# Patient Record
Sex: FEMALE | Race: WHITE | NOT HISPANIC OR LATINO | URBAN - METROPOLITAN AREA
[De-identification: names, ages, dates, MRNs, and addresses within clinical notes are randomized per-mention and may not be internally consistent; named-entity substitution may affect disease eponyms.]

---

## 2017-05-16 ENCOUNTER — OUTPATIENT (OUTPATIENT)
Dept: OUTPATIENT SERVICES | Facility: HOSPITAL | Age: 82
LOS: 1 days | End: 2017-05-16
Payer: COMMERCIAL

## 2017-05-16 ENCOUNTER — APPOINTMENT (OUTPATIENT)
Dept: CT IMAGING | Facility: CLINIC | Age: 82
End: 2017-05-16

## 2017-05-16 DIAGNOSIS — M51.36 OTHER INTERVERTEBRAL DISC DEGENERATION, LUMBAR REGION: ICD-10-CM

## 2017-05-16 PROCEDURE — 72131 CT LUMBAR SPINE W/O DYE: CPT

## 2017-09-08 ENCOUNTER — EMERGENCY (EMERGENCY)
Facility: HOSPITAL | Age: 82
LOS: 1 days | Discharge: HOME | End: 2017-09-08
Attending: EMERGENCY MEDICINE | Admitting: EMERGENCY MEDICINE
Payer: COMMERCIAL

## 2017-09-08 VITALS
OXYGEN SATURATION: 97 % | HEART RATE: 68 BPM | TEMPERATURE: 98 F | HEIGHT: 61 IN | DIASTOLIC BLOOD PRESSURE: 72 MMHG | SYSTOLIC BLOOD PRESSURE: 137 MMHG | RESPIRATION RATE: 12 BRPM | WEIGHT: 132.06 LBS

## 2017-09-08 VITALS
OXYGEN SATURATION: 100 % | RESPIRATION RATE: 15 BRPM | DIASTOLIC BLOOD PRESSURE: 76 MMHG | HEART RATE: 65 BPM | TEMPERATURE: 98 F | SYSTOLIC BLOOD PRESSURE: 132 MMHG

## 2017-09-08 DIAGNOSIS — I10 ESSENTIAL (PRIMARY) HYPERTENSION: ICD-10-CM

## 2017-09-08 DIAGNOSIS — R68.84 JAW PAIN: ICD-10-CM

## 2017-09-08 DIAGNOSIS — Z85.3 PERSONAL HISTORY OF MALIGNANT NEOPLASM OF BREAST: ICD-10-CM

## 2017-09-08 LAB
ALBUMIN SERPL ELPH-MCNC: 3.4 G/DL — SIGNIFICANT CHANGE UP (ref 3.3–5)
ALP SERPL-CCNC: 66 U/L — SIGNIFICANT CHANGE UP (ref 40–120)
ALT FLD-CCNC: 43 U/L — SIGNIFICANT CHANGE UP (ref 12–78)
ANION GAP SERPL CALC-SCNC: 10 MMOL/L — SIGNIFICANT CHANGE UP (ref 5–17)
APTT BLD: 27.2 SEC — LOW (ref 27.5–37.4)
AST SERPL-CCNC: 37 U/L — SIGNIFICANT CHANGE UP (ref 15–37)
BASOPHILS # BLD AUTO: 0.1 K/UL — SIGNIFICANT CHANGE UP (ref 0–0.2)
BASOPHILS NFR BLD AUTO: 0.8 % — SIGNIFICANT CHANGE UP (ref 0–2)
BILIRUB SERPL-MCNC: 0.6 MG/DL — SIGNIFICANT CHANGE UP (ref 0.2–1.2)
BUN SERPL-MCNC: 23 MG/DL — SIGNIFICANT CHANGE UP (ref 7–23)
CALCIUM SERPL-MCNC: 9.2 MG/DL — SIGNIFICANT CHANGE UP (ref 8.5–10.1)
CHLORIDE SERPL-SCNC: 108 MMOL/L — SIGNIFICANT CHANGE UP (ref 96–108)
CK MB BLD-MCNC: 2.6 % — SIGNIFICANT CHANGE UP (ref 0–3.5)
CK MB BLD-MCNC: 3.1 % — SIGNIFICANT CHANGE UP (ref 0–3.5)
CK MB CFR SERPL CALC: 1.8 NG/ML — SIGNIFICANT CHANGE UP (ref 0–3.6)
CK MB CFR SERPL CALC: 1.9 NG/ML — SIGNIFICANT CHANGE UP (ref 0–3.6)
CK SERPL-CCNC: 62 U/L — SIGNIFICANT CHANGE UP (ref 26–192)
CK SERPL-CCNC: 69 U/L — SIGNIFICANT CHANGE UP (ref 26–192)
CO2 SERPL-SCNC: 24 MMOL/L — SIGNIFICANT CHANGE UP (ref 22–31)
CREAT SERPL-MCNC: 1.2 MG/DL — SIGNIFICANT CHANGE UP (ref 0.5–1.3)
EOSINOPHIL # BLD AUTO: 0.2 K/UL — SIGNIFICANT CHANGE UP (ref 0–0.5)
EOSINOPHIL NFR BLD AUTO: 2 % — SIGNIFICANT CHANGE UP (ref 0–6)
GLUCOSE SERPL-MCNC: 157 MG/DL — HIGH (ref 70–99)
HCT VFR BLD CALC: 38.7 % — SIGNIFICANT CHANGE UP (ref 34.5–45)
HGB BLD-MCNC: 13.4 G/DL — SIGNIFICANT CHANGE UP (ref 11.5–15.5)
INR BLD: 1.11 RATIO — SIGNIFICANT CHANGE UP (ref 0.88–1.16)
LYMPHOCYTES # BLD AUTO: 1.7 K/UL — SIGNIFICANT CHANGE UP (ref 1–3.3)
LYMPHOCYTES # BLD AUTO: 21.7 % — SIGNIFICANT CHANGE UP (ref 13–44)
MCHC RBC-ENTMCNC: 34.7 GM/DL — SIGNIFICANT CHANGE UP (ref 32–36)
MCHC RBC-ENTMCNC: 35.1 PG — HIGH (ref 27–34)
MCV RBC AUTO: 101 FL — HIGH (ref 80–100)
MONOCYTES # BLD AUTO: 0.5 K/UL — SIGNIFICANT CHANGE UP (ref 0–0.9)
MONOCYTES NFR BLD AUTO: 6.5 % — SIGNIFICANT CHANGE UP (ref 1–9)
NEUTROPHILS # BLD AUTO: 5.5 K/UL — SIGNIFICANT CHANGE UP (ref 1.8–7.4)
NEUTROPHILS NFR BLD AUTO: 69 % — SIGNIFICANT CHANGE UP (ref 43–77)
NT-PROBNP SERPL-SCNC: 224 PG/ML — SIGNIFICANT CHANGE UP (ref 0–450)
PLATELET # BLD AUTO: 125 K/UL — LOW (ref 150–400)
POTASSIUM SERPL-MCNC: 3.7 MMOL/L — SIGNIFICANT CHANGE UP (ref 3.5–5.3)
POTASSIUM SERPL-SCNC: 3.7 MMOL/L — SIGNIFICANT CHANGE UP (ref 3.5–5.3)
PROT SERPL-MCNC: 6.8 G/DL — SIGNIFICANT CHANGE UP (ref 6–8.3)
PROTHROM AB SERPL-ACNC: 12.1 SEC — SIGNIFICANT CHANGE UP (ref 9.8–12.7)
RBC # BLD: 3.83 M/UL — SIGNIFICANT CHANGE UP (ref 3.8–5.2)
RBC # FLD: 11.4 % — SIGNIFICANT CHANGE UP (ref 10.3–14.5)
SODIUM SERPL-SCNC: 142 MMOL/L — SIGNIFICANT CHANGE UP (ref 135–145)
TROPONIN I SERPL-MCNC: 0.07 NG/ML — HIGH (ref 0.01–0.04)
TROPONIN I SERPL-MCNC: 0.07 NG/ML — HIGH (ref 0.01–0.04)
WBC # BLD: 8 K/UL — SIGNIFICANT CHANGE UP (ref 3.8–10.5)
WBC # FLD AUTO: 8 K/UL — SIGNIFICANT CHANGE UP (ref 3.8–10.5)

## 2017-09-08 PROCEDURE — 83880 ASSAY OF NATRIURETIC PEPTIDE: CPT

## 2017-09-08 PROCEDURE — 85730 THROMBOPLASTIN TIME PARTIAL: CPT

## 2017-09-08 PROCEDURE — 85610 PROTHROMBIN TIME: CPT

## 2017-09-08 PROCEDURE — 93005 ELECTROCARDIOGRAM TRACING: CPT

## 2017-09-08 PROCEDURE — 84484 ASSAY OF TROPONIN QUANT: CPT

## 2017-09-08 PROCEDURE — 99285 EMERGENCY DEPT VISIT HI MDM: CPT

## 2017-09-08 PROCEDURE — 82553 CREATINE MB FRACTION: CPT

## 2017-09-08 PROCEDURE — 71010: CPT | Mod: 26

## 2017-09-08 PROCEDURE — 80053 COMPREHEN METABOLIC PANEL: CPT

## 2017-09-08 PROCEDURE — 82550 ASSAY OF CK (CPK): CPT

## 2017-09-08 PROCEDURE — 71045 X-RAY EXAM CHEST 1 VIEW: CPT

## 2017-09-08 PROCEDURE — 85027 COMPLETE CBC AUTOMATED: CPT

## 2017-09-08 PROCEDURE — 99284 EMERGENCY DEPT VISIT MOD MDM: CPT | Mod: 25

## 2017-09-08 RX ORDER — SODIUM CHLORIDE 9 MG/ML
3 INJECTION INTRAMUSCULAR; INTRAVENOUS; SUBCUTANEOUS ONCE
Qty: 0 | Refills: 0 | Status: COMPLETED | OUTPATIENT
Start: 2017-09-08 | End: 2017-09-08

## 2017-09-08 RX ORDER — ASPIRIN/CALCIUM CARB/MAGNESIUM 324 MG
325 TABLET ORAL ONCE
Qty: 0 | Refills: 0 | Status: COMPLETED | OUTPATIENT
Start: 2017-09-08 | End: 2017-09-08

## 2017-09-08 RX ADMIN — Medication 325 MILLIGRAM(S): at 13:20

## 2017-09-08 RX ADMIN — SODIUM CHLORIDE 3 MILLILITER(S): 9 INJECTION INTRAMUSCULAR; INTRAVENOUS; SUBCUTANEOUS at 13:18

## 2017-09-08 NOTE — CONSULT NOTE ADULT - ASSESSMENT
Patient is a 89yo female with pmhx of CAD, HTN, s/p coronary stent x2, s/p pacemaker placement, breast ca s/p lumpectomy and mastectomy, gastric ulcer, UTI presented to ED with intermittent jaw pain.    - given patient denies chest pain, SOB, mildly elevated troponin and no ST changes noted on EKG, pain unlikely due to ischemia  - will need her most recent echo to fully evaluate heart function Patient is a 87yo female with pmhx of CAD, HTN, s/p coronary stent x2, s/p pacemaker placement, breast ca s/p lumpectomy and mastectomy, gastric ulcer, UTI presented to ED with intermittent jaw pain.    - given patient denies chest pain, SOB, mildly elevated troponin and no ST changes noted on EKG, pain unlikely due to ischemia  - will need her most recent echo to fully evaluate heart function  - trend cardiac enzyme   - monitor vitals Patient is a 87yo female with pmhx of CAD, HTN, s/p coronary stent x2, s/p pacemaker placement, breast ca s/p lumpectomy and mastectomy, gastric ulcer, UTI presented to ED with intermittent jaw pain.    - given patient denies chest pain, SOB, mildly elevated troponin and no ST changes noted on EKG, pain unlikely due to ischemia  - will need her most recent echo to fully evaluate heart function  - trend cardiac enzyme, repeat 1x cardiac enzyme at 6pm  - monitor vitals Patient is a 89yo female with pmhx of CAD, HTN, s/p coronary stent x2, s/p pacemaker placement, breast ca s/p lumpectomy and mastectomy, gastric ulcer, UTI presented to ED with intermittent jaw pain.    - given patient denies chest pain, SOB, mildly elevated troponin and no ST changes noted on EKG, need to r/o pain related to ischemia vs nonischemic pain  - will need her most recent echo to fully evaluate heart function  - trend cardiac enzyme, repeat cardiac enzyme x1 at 18:45  - monitor vitals  - patient will need follow up with her outpatient cardiologist when discharged Patient is a 87yo female with pmhx of CAD, HTN, s/p coronary stent x2, s/p pacemaker placement, breast ca s/p lumpectomy and mastectomy, gastric ulcer, UTI presented to ED with intermittent jaw pain.    - Pain similar to pain surrounding stents.  Troponin mildly positive with nl CK.  Need to repeat both at 645.  EKG unremarkable.    - If negative, d/c for outpatient echocardiogram and nuclear stress test.    - monitor vitals  - patient will need follow up with her outpatient cardiologist when discharged  - Continue aspirin

## 2017-09-08 NOTE — ED PROVIDER NOTE - PSH
Hernia  ventral hernia repaired x2 2005 and 2006  History of Coronary Angioplasty  1 stent placed 12/07  left  2005 with reconstruction, first implant, got infected then removed on left, tram flap done on left  axillary node dissection  had lymphedema  right lumpectomy  2001 with sentinal node biopsy  Stress Incontinence  had bladder lift x2 30 years ago and 28 years ago

## 2017-09-08 NOTE — CONSULT NOTE ADULT - SUBJECTIVE AND OBJECTIVE BOX
[facility]         Raj De La Cruz, Bessy, Kylah, Rose, Heather Borges        [phone] (office)    CHIEF COMPLAINT: Patient is a 88y old  Female who presents with a chief complaint of     HPI: Patient is a 89yo female with pmhx of CAD, HTN, s/p coronary stent x2, s/p pacemaker placement, breast ca s/p lumpectomy and mastectomy, gastric ulcer, UTI presented to ED with intermittent jaw pain since 8:30am today morning. Patient stated that the pain radiates to different side of her jaw and it comes and goes. Patient denies any chest pain, palpitations, SOB, or shoulder pain. Patient stated that she had some nausea associated with the jaw pain but has since then resolved. Patient stated that she was worried about her symptoms because she had similar symptoms prior to getting her stent. Patient follows with Dr. Ivey from [place] for cardiology, stated that she was told her ECHO was normal when she had the test done 6mo ago. Patient stated that she is able to walk around her house and up the stairs without any SOB. Currently, she is not complaining of any discomfort or pain. Stated that she is feeling well. Denies any headache, dizziness, chest pain, SOB, NIEVES, abdominal pain, weakness, or vomiting.       PAST MEDICAL & SURGICAL HISTORY:  Stented coronary artery: x2 2011 and 2013  UTI (Lower Urinary Tract Infection): last one 6 months ago  Breast Cancer: right side 2001  lumpectomy and s/p RT  left side 2005 s/p mastectomy  Gastric Ulcer: 2009  CAD (Coronary Artery Disease): 12/07  Hypertension  Insomnia  History of Coronary Angioplasty: 1 stent placed 12/07  Hernia: ventral hernia repaired x2 2005 and 2006  left: 2005 with reconstruction, first implant, got infected then removed on left, tram flap done on left  axillary node dissection  had lymphedema  right lumpectomy: 2001 with sentinal node biopsy  Stress Incontinence: had bladder lift x2 30 years ago and 28 years ago      SOCIAL HISTORY: No active tobacco, alcohol or illicit drug use    FAMILY HISTORY:      Outpatient medications:  - ramipril 5mg  - HCTZ 12.5mg  - metoprolol 100mg  - simvastatin 20mg  - aspirin 81mg  - ranitidine 300mg      Allergies    No Known Allergies    Intolerances        REVIEW OF SYSTEMS:   General: denies fever, chills, fatigue  HEENT: +jaw pain, denies headache, dizziness, visual changes, throat pain  Heart: denies chest pain, palpitations, edema  Lungs; denies SOB, NIEVES, or cough  Abdomen; +nausea, denies abdominal pain, vomiting  MSK: denies muscle pain      VITAL SIGNS:   Vital Signs Last 24 Hrs  T(C): 36.4 (08 Sep 2017 11:56), Max: 36.4 (08 Sep 2017 11:56)  T(F): 97.5 (08 Sep 2017 11:56), Max: 97.5 (08 Sep 2017 11:56)  HR: 68 (08 Sep 2017 11:56) (68 - 68)  BP: 137/72 (08 Sep 2017 11:56) (137/72 - 137/72)  BP(mean): --  RR: 12 (08 Sep 2017 11:56) (12 - 12)  SpO2: 97% (08 Sep 2017 11:56) (97% - 97%)    I&O's Summary      PHYSICAL EXAM:    Constitutional: NAD, awake and alert, well-developed  Eyes:  EOMI, no oral cyanosis, conjunctivae clear, anicteric.  Pulmonary: Non-labored, breath sounds are clear bilaterally, no wheezing, rales or rhonchi  Cardiovascular:  +distant heart sounds, regular S1 and S2. No murmur. No rubs, gallops or clicks  Gastrointestinal: Bowel Sounds present, soft, nontender.   Lymph: No peripheral edema.   Neurological: Alert, strength and sensitivity are grossly intact  Skin: No obvious lesions/rashes.   Psych:  Mood & affect appropriate .    LABS: All Labs Reviewed:                        13.4   8.0   )-----------( 125      ( 08 Sep 2017 12:42 )             38.7     08 Sep 2017 12:42    142    |  108    |  23     ----------------------------<  157    3.7     |  24     |  1.20     Ca    9.2        08 Sep 2017 12:42    TPro  6.8    /  Alb  3.4    /  TBili  0.6    /  DBili  x      /  AST  37     /  ALT  43     /  AlkPhos  66     08 Sep 2017 12:42    PT/INR - ( 08 Sep 2017 12:42 )   PT: 12.1 sec;   INR: 1.11 ratio         PTT - ( 08 Sep 2017 12:42 )  PTT:27.2 sec  CARDIAC MARKERS ( 08 Sep 2017 12:42 )  .066 ng/mL / x     / 69 U/L / x     / 1.8 ng/mL      Blood Culture:   09-08 @ 12:42  Pro Bnp 224      EKG: atrial paced rhythm, left axis deviation

## 2017-09-08 NOTE — ED PROVIDER NOTE - CHPI ED SYMPTOMS NEG
no vomiting/no cough/no chest pain/no diaphoresis/no dizziness/no fever/no back pain/no chills/no syncope

## 2017-09-08 NOTE — ED PROVIDER NOTE - PROGRESS NOTE DETAILS
Pt seen by Dr Rose, check CE 1900, if not increasing, ok to dc home for outpt fu. Pt doing well, no acute co or changes. results davie/w chica (cards) he cleared for d/c to f.u with her cardiologist at [facility] as outpt. Reevaluated patient at bedside.  Patient feeling well.  Discussed the results of all diagnostic testing in ED and copies of all reports given.   An opportunity to ask questions was given.  Discussed the importance of prompt, close medical follow-up.  Patient will return with any changes, concerns or persistent / worsening symptoms.  Understanding of all instructions verbalized.

## 2017-09-08 NOTE — ED PROVIDER NOTE - OBJECTIVE STATEMENT
87 yo F p/w mid jaw pain on / off since ~ 9am today. No assoc CP. No sob. no palp/dizzy. Some nausea assoc with sx. No numb/ting/focal weak. no abd pain. No neck / back pain. No recent NIEVES / easy fatigue. No agg/allev factors. No other inj or co. no recent trauma. NO other co.

## 2017-09-08 NOTE — ED ADULT NURSE REASSESSMENT NOTE - NS ED NURSE REASSESS COMMENT FT1
pt updated on POC, awaiting CE . pt still on cardiac, sat, bp monitor. pt denies jaw pain and nausea since arrival. will continue to monitor

## 2017-09-08 NOTE — ED ADULT NURSE NOTE - PMH
Breast Cancer  right side 2001  lumpectomy and s/p RT  left side 2005 s/p mastectomy  CAD (Coronary Artery Disease)  12/07  Gastric Ulcer  2009  Hypertension    Insomnia    Stented coronary artery  x2 2011 and 2013  UTI (Lower Urinary Tract Infection)  last one 6 months ago

## 2017-09-08 NOTE — ED ADULT NURSE NOTE - DISCHARGE TEACHING
patient discharged to home as per MD in stable condition. no apparent signs of acute cardiac or respiratory distress currently.  patient states she will follow up with cardiologist

## 2017-09-08 NOTE — ED ADULT NURSE NOTE - OBJECTIVE STATEMENT
pt reporting jaw pain and nausea since this am at 0900. pt stating that these are similar symptoms from when she had stent x2 placed in the past. pt reporting jaw pain and nausea since this am at 0900. pt stating that these are similar symptoms from when she had stent x2 placed in the past. denies CP, SOB, palpitations, lightheadedness and dizziness. pt placed on cardiac, sat, bp monitor. ekg and iv completed

## 2018-05-02 ENCOUNTER — OTHER (OUTPATIENT)
Age: 83
End: 2018-05-02

## 2018-05-10 ENCOUNTER — APPOINTMENT (OUTPATIENT)
Dept: ORTHOPEDIC SURGERY | Facility: CLINIC | Age: 83
End: 2018-05-10
Payer: MEDICARE

## 2018-05-10 VITALS
BODY MASS INDEX: 27.88 KG/M2 | SYSTOLIC BLOOD PRESSURE: 140 MMHG | DIASTOLIC BLOOD PRESSURE: 86 MMHG | HEART RATE: 87 BPM | WEIGHT: 142 LBS | HEIGHT: 60 IN

## 2018-05-10 DIAGNOSIS — Z78.9 OTHER SPECIFIED HEALTH STATUS: ICD-10-CM

## 2018-05-10 DIAGNOSIS — Z86.39 PERSONAL HISTORY OF OTHER ENDOCRINE, NUTRITIONAL AND METABOLIC DISEASE: ICD-10-CM

## 2018-05-10 DIAGNOSIS — Z86.79 PERSONAL HISTORY OF OTHER DISEASES OF THE CIRCULATORY SYSTEM: ICD-10-CM

## 2018-05-10 DIAGNOSIS — Z85.9 PERSONAL HISTORY OF MALIGNANT NEOPLASM, UNSPECIFIED: ICD-10-CM

## 2018-05-10 DIAGNOSIS — Z87.39 PERSONAL HISTORY OF OTHER DISEASES OF THE MUSCULOSKELETAL SYSTEM AND CONNECTIVE TISSUE: ICD-10-CM

## 2018-05-10 PROCEDURE — 99204 OFFICE O/P NEW MOD 45 MIN: CPT

## 2018-05-10 PROCEDURE — 73564 X-RAY EXAM KNEE 4 OR MORE: CPT | Mod: LT

## 2018-05-10 RX ORDER — FLUOROURACIL 50 MG/G
5 CREAM TOPICAL
Qty: 40 | Refills: 0 | Status: ACTIVE | COMMUNITY
Start: 2018-04-04

## 2018-05-10 RX ORDER — HYDROCHLOROTHIAZIDE 12.5 MG/1
12.5 CAPSULE ORAL
Qty: 90 | Refills: 0 | Status: ACTIVE | COMMUNITY
Start: 2018-02-28

## 2018-05-10 RX ORDER — RAMIPRIL 5 MG/1
5 CAPSULE ORAL
Qty: 180 | Refills: 0 | Status: ACTIVE | COMMUNITY
Start: 2018-02-28

## 2018-05-10 RX ORDER — TRAMADOL HYDROCHLORIDE AND ACETAMINOPHEN 37.5; 325 MG/1; MG/1
37.5-325 TABLET, FILM COATED ORAL
Qty: 30 | Refills: 0 | Status: ACTIVE | COMMUNITY
Start: 2018-03-14

## 2018-05-10 RX ORDER — SIMVASTATIN 20 MG/1
20 TABLET, FILM COATED ORAL
Qty: 90 | Refills: 0 | Status: ACTIVE | COMMUNITY
Start: 2018-01-31

## 2018-05-10 RX ORDER — HYALURONATE SODIUM 20 MG/2 ML
20 SYRINGE (ML) INTRAARTICULAR
Qty: 6 | Refills: 0 | Status: ACTIVE | OUTPATIENT
Start: 2018-05-10

## 2018-05-10 RX ORDER — RANITIDINE 300 MG/1
300 TABLET ORAL
Qty: 90 | Refills: 0 | Status: ACTIVE | COMMUNITY
Start: 2018-02-28

## 2018-05-10 RX ORDER — METOPROLOL SUCCINATE 100 MG/1
100 TABLET, EXTENDED RELEASE ORAL
Qty: 90 | Refills: 0 | Status: ACTIVE | COMMUNITY
Start: 2018-02-28

## 2018-05-10 RX ORDER — ONDANSETRON 4 MG/1
4 TABLET, ORALLY DISINTEGRATING ORAL
Qty: 15 | Refills: 0 | Status: ACTIVE | COMMUNITY
Start: 2018-01-24

## 2018-09-06 PROBLEM — Z95.5 PRESENCE OF CORONARY ANGIOPLASTY IMPLANT AND GRAFT: Chronic | Status: ACTIVE | Noted: 2017-09-08

## 2018-09-17 ENCOUNTER — OTHER (OUTPATIENT)
Age: 83
End: 2018-09-17

## 2018-09-24 ENCOUNTER — APPOINTMENT (OUTPATIENT)
Dept: ORTHOPEDIC SURGERY | Facility: CLINIC | Age: 83
End: 2018-09-24
Payer: MEDICARE

## 2018-09-24 VITALS
WEIGHT: 142 LBS | BODY MASS INDEX: 27.88 KG/M2 | HEIGHT: 60 IN | HEART RATE: 80 BPM | DIASTOLIC BLOOD PRESSURE: 78 MMHG | SYSTOLIC BLOOD PRESSURE: 148 MMHG

## 2018-09-24 DIAGNOSIS — M17.12 UNILATERAL PRIMARY OSTEOARTHRITIS, LEFT KNEE: ICD-10-CM

## 2018-09-24 PROCEDURE — 73564 X-RAY EXAM KNEE 4 OR MORE: CPT | Mod: LT

## 2018-09-24 PROCEDURE — 99214 OFFICE O/P EST MOD 30 MIN: CPT

## 2018-09-24 RX ORDER — OSELTAMIVIR PHOSPHATE 75 MG/1
75 CAPSULE ORAL
Qty: 10 | Refills: 0 | Status: DISCONTINUED | COMMUNITY
Start: 2018-01-24 | End: 2018-09-24

## 2018-09-24 RX ORDER — RANITIDINE HYDROCHLORIDE 300 MG/1
CAPSULE ORAL
Refills: 0 | Status: DISCONTINUED | COMMUNITY
End: 2018-09-24

## 2018-09-24 RX ORDER — CIPROFLOXACIN HYDROCHLORIDE 500 MG/1
500 TABLET, FILM COATED ORAL
Qty: 14 | Refills: 0 | Status: DISCONTINUED | COMMUNITY
Start: 2018-04-16 | End: 2018-09-24

## 2018-09-24 RX ORDER — RAMIPRIL 5 MG/1
CAPSULE ORAL
Refills: 0 | Status: DISCONTINUED | COMMUNITY
End: 2018-09-24

## 2018-09-24 RX ORDER — AZITHROMYCIN 250 MG/1
250 TABLET, FILM COATED ORAL
Qty: 6 | Refills: 0 | Status: DISCONTINUED | COMMUNITY
Start: 2018-04-23 | End: 2018-09-24

## 2019-11-06 NOTE — ED PROVIDER NOTE - NEUROLOGICAL, MLM
Medical record reviewed.    Attempted to reach patient at preferred listed phone number. Unable to reach patient at this time or leave [facility].     Writer reached out to Goldie with [employer] who has been UNABLE to connect with patient or her spouse. She has left messages but has not received a call back.     Next HRTIC outreach scheduled.    HRTIC Outreach Encounter scheduled to close: Will monitor until 11/11/19 to see if writer can connect with patient one more time before signing off on case, close date was 11/7/19.     Leanne Arthur  Transitional Care RN  [phone]    
Alert and oriented, no focal deficits, no motor or sensory deficits.

## 2021-02-24 ENCOUNTER — INPATIENT (INPATIENT)
Facility: HOSPITAL | Age: 86
LOS: 2 days | Discharge: HOME | DRG: 433 | End: 2021-02-27
Attending: HOSPITALIST | Admitting: HOSPITALIST
Payer: COMMERCIAL

## 2021-02-24 VITALS
TEMPERATURE: 98 F | HEIGHT: 61 IN | SYSTOLIC BLOOD PRESSURE: 113 MMHG | RESPIRATION RATE: 18 BRPM | WEIGHT: 130.07 LBS | HEART RATE: 69 BPM | DIASTOLIC BLOOD PRESSURE: 60 MMHG | OXYGEN SATURATION: 94 %

## 2021-02-24 DIAGNOSIS — R18.8 OTHER ASCITES: ICD-10-CM

## 2021-02-24 LAB
ALBUMIN SERPL ELPH-MCNC: 3 G/DL — LOW (ref 3.3–5.2)
ALP SERPL-CCNC: 136 U/L — HIGH (ref 40–120)
ALT FLD-CCNC: 30 U/L — SIGNIFICANT CHANGE UP
ANION GAP SERPL CALC-SCNC: 8 MMOL/L — SIGNIFICANT CHANGE UP (ref 5–17)
ANISOCYTOSIS BLD QL: SLIGHT — SIGNIFICANT CHANGE UP
APPEARANCE UR: CLEAR — SIGNIFICANT CHANGE UP
APTT BLD: 31.8 SEC — SIGNIFICANT CHANGE UP (ref 27.5–35.5)
AST SERPL-CCNC: 41 U/L — HIGH
B PERT IGG+IGM PNL SER: ABNORMAL
BACTERIA # UR AUTO: ABNORMAL
BASOPHILS # BLD AUTO: 0.1 K/UL — SIGNIFICANT CHANGE UP (ref 0–0.2)
BASOPHILS NFR BLD AUTO: 0.9 % — SIGNIFICANT CHANGE UP (ref 0–2)
BILIRUB SERPL-MCNC: 1 MG/DL — SIGNIFICANT CHANGE UP (ref 0.4–2)
BILIRUB UR-MCNC: NEGATIVE — SIGNIFICANT CHANGE UP
BLD GP AB SCN SERPL QL: SIGNIFICANT CHANGE UP
BUN SERPL-MCNC: 25 MG/DL — HIGH (ref 8–20)
CALCIUM SERPL-MCNC: 9.6 MG/DL — SIGNIFICANT CHANGE UP (ref 8.6–10.2)
CHLORIDE SERPL-SCNC: 107 MMOL/L — SIGNIFICANT CHANGE UP (ref 98–107)
CO2 SERPL-SCNC: 22 MMOL/L — SIGNIFICANT CHANGE UP (ref 22–29)
COLOR FLD: YELLOW
COLOR SPEC: YELLOW — SIGNIFICANT CHANGE UP
CREAT SERPL-MCNC: 1.33 MG/DL — HIGH (ref 0.5–1.3)
DACRYOCYTES BLD QL SMEAR: SLIGHT — SIGNIFICANT CHANGE UP
DIFF PNL FLD: NEGATIVE — SIGNIFICANT CHANGE UP
EOSINOPHIL # BLD AUTO: 0 K/UL — SIGNIFICANT CHANGE UP (ref 0–0.5)
EOSINOPHIL NFR BLD AUTO: 0 % — SIGNIFICANT CHANGE UP (ref 0–6)
EPI CELLS # UR: SIGNIFICANT CHANGE UP
FLUID INTAKE SUBSTANCE CLASS: SIGNIFICANT CHANGE UP
FLUID SEGMENTED GRANULOCYTES: 9 % — SIGNIFICANT CHANGE UP
GLUCOSE SERPL-MCNC: 120 MG/DL — HIGH (ref 70–99)
GLUCOSE UR QL: NEGATIVE — SIGNIFICANT CHANGE UP
GRAM STN FLD: SIGNIFICANT CHANGE UP
HCT VFR BLD CALC: 38.3 % — SIGNIFICANT CHANGE UP (ref 34.5–45)
HGB BLD-MCNC: 12.7 G/DL — SIGNIFICANT CHANGE UP (ref 11.5–15.5)
INR BLD: 1.39 RATIO — HIGH (ref 0.88–1.16)
KETONES UR-MCNC: NEGATIVE — SIGNIFICANT CHANGE UP
LEUKOCYTE ESTERASE UR-ACNC: ABNORMAL
LIDOCAIN IGE QN: 30 U/L — SIGNIFICANT CHANGE UP (ref 22–51)
LYMPHOCYTES # BLD AUTO: 0.82 K/UL — LOW (ref 1–3.3)
LYMPHOCYTES # BLD AUTO: 7 % — LOW (ref 13–44)
LYMPHOCYTES # FLD: 35 % — SIGNIFICANT CHANGE UP
MACROCYTES BLD QL: SLIGHT — SIGNIFICANT CHANGE UP
MANUAL SMEAR VERIFICATION: SIGNIFICANT CHANGE UP
MCHC RBC-ENTMCNC: 33.2 GM/DL — SIGNIFICANT CHANGE UP (ref 32–36)
MCHC RBC-ENTMCNC: 34.4 PG — HIGH (ref 27–34)
MCV RBC AUTO: 103.8 FL — HIGH (ref 80–100)
MESOTHL CELL # FLD: 2 % — SIGNIFICANT CHANGE UP
MONOCYTES # BLD AUTO: 0.71 K/UL — SIGNIFICANT CHANGE UP (ref 0–0.9)
MONOCYTES NFR BLD AUTO: 6.1 % — SIGNIFICANT CHANGE UP (ref 2–14)
MONOS+MACROS # FLD: 54 % — SIGNIFICANT CHANGE UP
NEUTROPHILS # BLD AUTO: 9.61 K/UL — HIGH (ref 1.8–7.4)
NEUTROPHILS NFR BLD AUTO: 82.5 % — HIGH (ref 43–77)
NITRITE UR-MCNC: POSITIVE
OVALOCYTES BLD QL SMEAR: SLIGHT — SIGNIFICANT CHANGE UP
PH UR: 6 — SIGNIFICANT CHANGE UP (ref 5–8)
PLAT MORPH BLD: NORMAL — SIGNIFICANT CHANGE UP
PLATELET # BLD AUTO: 85 K/UL — LOW (ref 150–400)
POIKILOCYTOSIS BLD QL AUTO: SLIGHT — SIGNIFICANT CHANGE UP
POTASSIUM SERPL-MCNC: 4.2 MMOL/L — SIGNIFICANT CHANGE UP (ref 3.5–5.3)
POTASSIUM SERPL-SCNC: 4.2 MMOL/L — SIGNIFICANT CHANGE UP (ref 3.5–5.3)
PROT SERPL-MCNC: 5.8 G/DL — LOW (ref 6.6–8.7)
PROT UR-MCNC: NEGATIVE — SIGNIFICANT CHANGE UP
PROTHROM AB SERPL-ACNC: 15.9 SEC — HIGH (ref 10.6–13.6)
RBC # BLD: 3.69 M/UL — LOW (ref 3.8–5.2)
RBC # FLD: 14.1 % — SIGNIFICANT CHANGE UP (ref 10.3–14.5)
RBC BLD AUTO: ABNORMAL
RBC CASTS # UR COMP ASSIST: SIGNIFICANT CHANGE UP /HPF (ref 0–4)
RCV VOL RI: 800 /UL — HIGH (ref 0–0)
SARS-COV-2 RNA SPEC QL NAA+PROBE: SIGNIFICANT CHANGE UP
SODIUM SERPL-SCNC: 137 MMOL/L — SIGNIFICANT CHANGE UP (ref 135–145)
SP GR SPEC: 1.01 — SIGNIFICANT CHANGE UP (ref 1.01–1.02)
SPECIMEN SOURCE: SIGNIFICANT CHANGE UP
TOTAL NUCLEATED CELL COUNT, BODY FLUID: 292 /UL — SIGNIFICANT CHANGE UP
TUBE TYPE: SIGNIFICANT CHANGE UP
UROBILINOGEN FLD QL: NEGATIVE — SIGNIFICANT CHANGE UP
VARIANT LYMPHS # BLD: 3.5 % — SIGNIFICANT CHANGE UP (ref 0–6)
WBC # BLD: 11.65 K/UL — HIGH (ref 3.8–10.5)
WBC # FLD AUTO: 11.65 K/UL — HIGH (ref 3.8–10.5)
WBC UR QL: SIGNIFICANT CHANGE UP

## 2021-02-24 PROCEDURE — 93010 ELECTROCARDIOGRAM REPORT: CPT

## 2021-02-24 PROCEDURE — 99223 1ST HOSP IP/OBS HIGH 75: CPT

## 2021-02-24 PROCEDURE — 99497 ADVNCD CARE PLAN 30 MIN: CPT | Mod: 25

## 2021-02-24 PROCEDURE — 74176 CT ABD & PELVIS W/O CONTRAST: CPT | Mod: 26,MA

## 2021-02-24 PROCEDURE — 49083 ABD PARACENTESIS W/IMAGING: CPT

## 2021-02-24 PROCEDURE — 99285 EMERGENCY DEPT VISIT HI MDM: CPT

## 2021-02-24 PROCEDURE — 71045 X-RAY EXAM CHEST 1 VIEW: CPT | Mod: 26

## 2021-02-24 RX ORDER — ASPIRIN/CALCIUM CARB/MAGNESIUM 324 MG
81 TABLET ORAL DAILY
Refills: 0 | Status: DISCONTINUED | OUTPATIENT
Start: 2021-02-24 | End: 2021-02-27

## 2021-02-24 RX ORDER — ALLOPURINOL 300 MG
200 TABLET ORAL DAILY
Refills: 0 | Status: DISCONTINUED | OUTPATIENT
Start: 2021-02-24 | End: 2021-02-27

## 2021-02-24 RX ORDER — SODIUM CHLORIDE 9 MG/ML
500 INJECTION, SOLUTION INTRAVENOUS
Refills: 0 | Status: COMPLETED | OUTPATIENT
Start: 2021-02-24 | End: 2021-02-24

## 2021-02-24 RX ORDER — METOPROLOL TARTRATE 50 MG
1 TABLET ORAL
Qty: 0 | Refills: 0 | DISCHARGE

## 2021-02-24 RX ORDER — LACTOBACILLUS ACIDOPHILUS 100MM CELL
1 CAPSULE ORAL
Refills: 0 | Status: DISCONTINUED | OUTPATIENT
Start: 2021-02-24 | End: 2021-02-27

## 2021-02-24 RX ORDER — HEPARIN SODIUM 5000 [USP'U]/ML
5000 INJECTION INTRAVENOUS; SUBCUTANEOUS EVERY 12 HOURS
Refills: 0 | Status: DISCONTINUED | OUTPATIENT
Start: 2021-02-25 | End: 2021-02-27

## 2021-02-24 RX ORDER — PANTOPRAZOLE SODIUM 20 MG/1
40 TABLET, DELAYED RELEASE ORAL DAILY
Refills: 0 | Status: DISCONTINUED | OUTPATIENT
Start: 2021-02-24 | End: 2021-02-25

## 2021-02-24 RX ORDER — METOPROLOL TARTRATE 50 MG
50 TABLET ORAL DAILY
Refills: 0 | Status: DISCONTINUED | OUTPATIENT
Start: 2021-02-24 | End: 2021-02-24

## 2021-02-24 RX ORDER — ONDANSETRON 8 MG/1
4 TABLET, FILM COATED ORAL EVERY 6 HOURS
Refills: 0 | Status: DISCONTINUED | OUTPATIENT
Start: 2021-02-24 | End: 2021-02-27

## 2021-02-24 RX ORDER — RANITIDINE HYDROCHLORIDE 150 MG/1
1 TABLET, FILM COATED ORAL
Qty: 0 | Refills: 0 | DISCHARGE

## 2021-02-24 RX ORDER — CEFTRIAXONE 500 MG/1
1000 INJECTION, POWDER, FOR SOLUTION INTRAMUSCULAR; INTRAVENOUS ONCE
Refills: 0 | Status: COMPLETED | OUTPATIENT
Start: 2021-02-24 | End: 2021-02-24

## 2021-02-24 RX ORDER — ACETAMINOPHEN 500 MG
885 TABLET ORAL EVERY 6 HOURS
Refills: 0 | Status: DISCONTINUED | OUTPATIENT
Start: 2021-02-24 | End: 2021-02-24

## 2021-02-24 RX ORDER — CEFTRIAXONE 500 MG/1
1000 INJECTION, POWDER, FOR SOLUTION INTRAMUSCULAR; INTRAVENOUS EVERY 24 HOURS
Refills: 0 | Status: COMPLETED | OUTPATIENT
Start: 2021-02-25 | End: 2021-02-27

## 2021-02-24 RX ORDER — ACETAMINOPHEN 500 MG
650 TABLET ORAL EVERY 6 HOURS
Refills: 0 | Status: DISCONTINUED | OUTPATIENT
Start: 2021-02-24 | End: 2021-02-27

## 2021-02-24 RX ORDER — CEFTRIAXONE 500 MG/1
INJECTION, POWDER, FOR SOLUTION INTRAMUSCULAR; INTRAVENOUS
Refills: 0 | Status: COMPLETED | OUTPATIENT
Start: 2021-02-24 | End: 2021-02-28

## 2021-02-24 RX ORDER — METOPROLOL TARTRATE 50 MG
50 TABLET ORAL DAILY
Refills: 0 | Status: DISCONTINUED | OUTPATIENT
Start: 2021-02-24 | End: 2021-02-27

## 2021-02-24 RX ADMIN — Medication 1 TABLET(S): at 18:59

## 2021-02-24 RX ADMIN — SODIUM CHLORIDE 75 MILLILITER(S): 9 INJECTION, SOLUTION INTRAVENOUS at 18:42

## 2021-02-24 RX ADMIN — PANTOPRAZOLE SODIUM 40 MILLIGRAM(S): 20 TABLET, DELAYED RELEASE ORAL at 18:43

## 2021-02-24 RX ADMIN — CEFTRIAXONE 100 MILLIGRAM(S): 500 INJECTION, POWDER, FOR SOLUTION INTRAMUSCULAR; INTRAVENOUS at 18:42

## 2021-02-24 NOTE — H&P ADULT - NSICDXPASTSURGICALHX_GEN_ALL_CORE_FT
PAST SURGICAL HISTORY:  Hernia ventral hernia repaired x2 2005 and 2006    History of Coronary Angioplasty 1 stent placed 12/07    left 2005 with reconstruction, first implant, got infected then removed on left, tram flap done on left  axillary node dissection  had lymphedema    right lumpectomy 2001 with sentinal node biopsy    Stress Incontinence had bladder lift x2 30 years ago and 28 years ago

## 2021-02-24 NOTE — ED ADULT TRIAGE NOTE - CHIEF COMPLAINT QUOTE
pt from home diagnosed with ascites last week reports decreased eating and drinking x 3 days with painful swallowing and RUQ pain. Pt with scheduled paracentesis on Tuesday, no fluid removed yet.

## 2021-02-24 NOTE — ED PROVIDER NOTE - CHPI ED SYMPTOMS POS
Krystal Becerra was gone for the day so I spoke with Gray Vora stating that was fine for his CT to be done at that time and he will have his follow up with Criselda Garibay on 2/11/20  Gray Vora stated she would relay the message to Krystal Becerra 
Rosibel Cevallos from [facility] [place] called for clarification regarding patient's CT chest scan  The Report of Consult filled out by Maria Teresa Vargas states the CT will be done now, however, the AVS has the apt scheduled for 01/28/2020  Office note for today states CT will be done within the next 1 month  Please clarify when the CT chest scan is supposed to be done, and call Rosibel Cevallos at [phone] ext [phone] 
ABDOMINAL DISTENSION/PAIN/CRAMPS

## 2021-02-24 NOTE — ED ADULT NURSE NOTE - OBJECTIVE STATEMENT
As per daughter, pt has paracentesis scheduled for tuesday, however, pt having difficulty eating or drinking anything due to discomfort, abdomen distended, no acute s/s of respiratory distress noted or reported at this time, will continue to monitor

## 2021-02-24 NOTE — H&P ADULT - ASSESSMENT
Patient is a 89 y/o woman with pmhx of hypertension, gout, cad s/p PCI x 2 (2011, 2013), h/o UTIs, cryptogenic cirrhosis, Breast cancer (s/p right sided lumpectomy 2001 s/p RT and then left sided in 2005 s/p mastectomy), gastric ulcer  and hyperlipidemia who presents with c/o abdominal distention/discomfort x 5 days. +dysphagia and odynophagia with presumed cryptogenic cirrhosis. Admitted for ascites with fluid removal and ASTER     Abdominal distention 2/2 ascites 2/2 cryptogenic cirrhosis potentially due to ? NAFLD from KENT  complicated by ASTER likely 2/2 hepatorenal syndrome  with Elevated INR likely due to primary cirrhosis   -s/p IR tap, pending rest of fluid analysis (need fluid albumin for saag)  -pain control with tylenol (LFTS are not fully elevated, but AST mildly elevated, however, in a cirrhotic patient do not expect high LFTS)  -f/up fluid analysis   -seen by GI, recs noted   -started on IV PPI (for a few days and can transition to PO on discharge)   -trial of short dose of plasmalyte for renal dysfunction   -f/up Cr in am   -avoid nephrotoxic meds   -hepatitis panel ordered     Odynophagia/dysphagia 2/2 unclear (could be due to prolonged gerd vs fungal infection vs r/o malignancy)   -eats normal consistency at home prior to this acute episode  -swallow eval to start, consider least invasive diagnostics first  -based on swallow eval, can do MBS if team agrees   -daughter hesitant to have patient palced under anesthesia; based on swallow can consider ENT eval next for fiberoptic at bedside     Dysuria with leucocytosis 2/2 UTI   -frequent UTIs   -treat with rocephin x 3 days first and bacid   -urine culture to direct prolonged course of abx   -due to frequent nature, rec urogyn eval as OP     Gout - hold colchicine, decrease allopurinol. f/up uric acid level     HLD - hold statin for now, f/up lfts in am, and if stable, resume statin (need to hold if lfts are 3-5x ULN LFTs)     HTN - well controlled. Due to large volume tap, will place metoprolol with parameters   -hold hctz in light of aster     DVT/GI PPX - ppi started, venodynes/sqh to start in am     Dispo - d/c home once med stable. PT eval placed. Plan d/w daughter. See GOC discussion      Patient is a 89 y/o woman with pmhx of hypertension, gout, cad s/p PCI x 2 (2011, 2013), h/o UTIs, cryptogenic cirrhosis, Breast cancer (s/p right sided lumpectomy 2001 s/p RT and then left sided in 2005 s/p mastectomy), gastric ulcer  and hyperlipidemia who presents with c/o abdominal distention/discomfort x 5 days. +dysphagia and odynophagia with presumed cryptogenic cirrhosis. Admitted for ascites with fluid removal and ASTER     Abdominal distention 2/2 ascites 2/2 cryptogenic cirrhosis potentially due to ? NAFLD from KENT  complicated by ASTER likely 2/2 hepatorenal syndrome  with Elevated INR likely due to primary cirrhosis   -s/p IR tap, pending rest of fluid analysis (need fluid albumin for saag)  -pain control with tylenol (LFTS are not fully elevated, but AST mildly elevated, however, in a cirrhotic patient do not expect high LFTS)  -f/up fluid analysis   -seen by GI, recs noted   -started on IV PPI (for a few days and can transition to PO on discharge)   -trial of short dose of plasmalyte for renal dysfunction   -f/up Cr in am   -avoid nephrotoxic meds   -hepatitis panel ordered   *CXR findings likely due to ascites and diaphragmatic elevation, repeat xray in am     Odynophagia/dysphagia 2/2 unclear (could be due to prolonged gerd vs fungal infection vs r/o malignancy)   -eats normal consistency at home prior to this acute episode  -swallow eval to start, consider least invasive diagnostics first  -based on swallow eval, can do MBS if team agrees   -daughter hesitant to have patient palced under anesthesia; based on swallow can consider ENT eval next for fiberoptic at bedside     Dysuria with leucocytosis 2/2 UTI   -frequent UTIs   -treat with rocephin x 3 days first and bacid   -urine culture to direct prolonged course of abx   -due to frequent nature, rec urogyn eval as OP     Gout - hold colchicine, decrease allopurinol. f/up uric acid level     HLD - hold statin for now, f/up lfts in am, and if stable, resume statin (need to hold if lfts are 3-5x ULN LFTs)     HTN - well controlled. Due to large volume tap, will place metoprolol with parameters   -hold hctz in light of aster     DVT/GI PPX - ppi started, venodynes/sqh to start in am     Dispo - d/c home once med stable. PT eval placed. Plan d/w daughter. See GOC discussion      Patient is a 89 y/o woman with pmhx of hypertension, gout, cad s/p PCI x 2 (2011, 2013), h/o UTIs, cryptogenic cirrhosis, Breast cancer (s/p right sided lumpectomy 2001 s/p RT and then left sided in 2005 s/p mastectomy), gastric ulcer  and hyperlipidemia who presents with c/o abdominal distention/discomfort x 5 days. +dysphagia and odynophagia with presumed cryptogenic cirrhosis. Admitted for ascites with fluid removal and ASTER     Abdominal distention 2/2 ascites 2/2 cryptogenic cirrhosis potentially due to ? NAFLD from KENT  complicated by ASTER likely 2/2 hepatorenal syndrome  with Elevated INR likely due to primary cirrhosis   -s/p IR tap, pending rest of fluid analysis (need fluid albumin for saag)  -pain control with tylenol (LFTS are not fully elevated, but AST mildly elevated, however, in a cirrhotic patient do not expect high LFTS)  -f/up fluid analysis   -seen by GI, recs noted   -started on IV PPI (for a few days and can transition to PO on discharge)   -trial of short dose of plasmalyte for renal dysfunction   -f/up Cr in am   -avoid nephrotoxic meds   -hepatitis panel ordered   *CXR findings likely due to ascites and diaphragmatic elevation, repeat xray in am   -renal evaluation (in the event that the cr increases)    Odynophagia/dysphagia 2/2 unclear (could be due to prolonged gerd vs fungal infection vs r/o malignancy)   -eats normal consistency at home prior to this acute episode  -swallow eval to start, consider least invasive diagnostics first  -based on swallow eval, can do MBS if team agrees   -daughter hesitant to have patient palced under anesthesia; based on swallow can consider ENT eval next for fiberoptic at bedside     Dysuria with leucocytosis 2/2 UTI   -frequent UTIs   -treat with rocephin x 3 days first and bacid   -urine culture to direct prolonged course of abx   -due to frequent nature, rec urogyn eval as OP     Gout - hold colchicine, decrease allopurinol. f/up uric acid level     HLD - hold statin for now, f/up lfts in am, and if stable, resume statin (need to hold if lfts are 3-5x ULN LFTs)     HTN - well controlled. Due to large volume tap, will place metoprolol with parameters   -hold hctz in light of aster     DVT/GI PPX - ppi started, venodynes/sqh to start in am     Dispo - d/c home once med stable. PT eval placed. Plan d/w daughter. See GOC discussion

## 2021-02-24 NOTE — CONSULT NOTE ADULT - SUBJECTIVE AND OBJECTIVE BOX
HISTORY OF PRESENT ILLNESS: This is a 89y old woman with a past medical history significant for breast cancer, CAD and cryptogenic cirrhosis who presented to the ED at the behest of her outpatient provider, Dr. Ritchie Horton for workup of new onset ascites.  She reports new onset dysphagia/odynophagia.  All symptoms started about two weeks ago.  She denies any history of alcohol abuse.  She had been told she had a fatty liver in the past.  Her dysphagia/odynophagia is to both liquids and solids.  She last underwent endoscopic evaluation many years ago, and she cannot recall the results thereof.    ROS: A 14-point review of systems was completed and was otherwise negative save what was reported in the HPI.    PAST MEDICAL/SURGICAL HISTORY:  Stented coronary artery, x2 2011 and 2013  UTI (Lower Urinary Tract Infection), last one 6 months ago  Breast Cancer, right side 2001  lumpectomy and s/p RT, left side 2005 s/p mastectomy  Gastric Ulcer, 2009  CAD (Coronary Artery Disease), 12/07  Hypertension  Insomnia  History of Coronary Angioplasty, 1 stent placed 12/07  Hernia, ventral hernia repaired x2 2005 and 2006    left  2005 with reconstruction, first implant, got infected then removed on left, tram flap done on left  axillary node dissection  had lymphedema    right lumpectomy  2001 with sentinal node biopsy    Stress Incontinence  had bladder lift x2 30 years ago and 28 years ago      SOCIAL HISTORY:  - TOBACCO: Denies  - ALCOHOL: Denies  - ILLICIT DRUG USE: Denies    FAMILY HISTORY:  No known history of gastrointestinal or liver disease;      HOME MEDICATIONS:  aspirin 81 mg oral tablet: 1 tab(s) orally once a day (08 Sep 2017 12:06)  hydroCHLOROthiazide 12.5 mg oral capsule: 1 cap(s) orally once a day (08 Sep 2017 12:06)  metoprolol succinate 100 mg oral tablet, extended release: 1 tab(s) orally once a day (08 Sep 2017 12:06)  ramipril 5 mg oral capsule: 1 cap(s) orally once a day (08 Sep 2017 12:06)  raNITIdine 300 mg oral tablet: 1 tab(s) orally once a day (at bedtime) (08 Sep 2017 12:06)  simvastatin 20 mg oral tablet: 1 tab(s) orally once a day (at bedtime) (08 Sep 2017 12:06)    INPATIENT MEDICATIONS:  MEDICATIONS  (STANDING):    MEDICATIONS  (PRN):    ALLERGIES:  No Known Allergies    T(C): 36.8 (02-24-21 @ 08:55), Max: 36.8 (02-24-21 @ 08:55)  HR: 69 (02-24-21 @ 08:55) (69 - 69)  BP: 113/60 (02-24-21 @ 08:55) (113/60 - 113/60)  RR: 18 (02-24-21 @ 08:55) (18 - 18)  SpO2: 94% (02-24-21 @ 08:55) (94% - 94%)      PHYSICAL EXAM:  Constitutional: Well-developed, elderly woman in no apparent distress  Eyes: Sclerae anicteric, conjunctivae normal  ENMT: Mucus membranes moist, no oropharyngeal thrush noted  Neck: No thyroid nodules appreciated, no significant cervical or supraclavicular lymphadenopathy  Respiratory: Breathing nonlabored; clear to auscultation  Cardiovascular: Regular rate and rhythm  Gastrointestinal: Soft, nontender, distended, dull to percussion, dilated collateral veins, normoactive bowel sounds; no hepatosplenomegaly appreciated; no rebound tenderness or involuntary guarding  Extremities: No clubbing, cyanosis or edema  Neurological: Alert and oriented to person, place and time; no asterixis  Skin: No jaundice  Lymph Nodes: No significant lymphadenopathy  Musculoskeletal: No significant peripheral atrophy  Psychiatric: Affect and mood appropriate      LABS:             12.7   11.65 )-----------( 85       ( 02-24 @ 09:41 )             38.3       PT/INR - ( 24 Feb 2021 13:29 )   PT: 15.9 sec;   INR: 1.39 ratio         PTT - ( 24 Feb 2021 13:29 )  PTT:31.8 sec  02-24    137  |  107  |  25.0<H>  ----------------------------<  120<H>  4.2   |  22.0  |  1.33<H>    Ca    9.6      24 Feb 2021 09:41    TPro  5.8<L>  /  Alb  3.0<L>  /  TBili  1.0  /  DBili  x   /  AST  41<H>  /  ALT  30  /  AlkPhos  136<H>  02-24    LIVER FUNCTIONS - ( 24 Feb 2021 09:41 )  Alb: 3.0 g/dL / Pro: 5.8 g/dL / ALK PHOS: 136 U/L / ALT: 30 U/L / AST: 41 U/L / GGT: x           Lipase, Serum: 30 U/L (02-24-21 @ 09:41)    MELD-Na: 15  Dialysis at least twice in past week: Y/N?  Creatinine:  1.33  Total Bili:  1.0  INR: 1.39  Sodium: 137      IMAGING: I personally reviewed the CT A/P, and I agree with the radiologist's interpretation as described below:    < from: CT Abdomen and Pelvis No Cont (02.24.21 @ 10:55) >   EXAM:  CT ABDOMEN AND PELVIS                          PROCEDURE DATE:  02/24/2021          INTERPRETATION:  CLINICAL INFORMATION: Abdominal pain.    COMPARISON: 11/21/2014.    PROCEDURE:  CT of the Abdomen and Pelvis was performed without intravenous contrast.  Intravenous contrast: None.  Oral contrast: None.  Sagittal and coronal reformats were performed.    FINDINGS:  LOWER CHEST: Subsegmental atelectasis bilaterally. Pacemaker. Coronary artery calcification. Right sided breast implant. Small hiatal hernia and possible mural thickening distal esophagus. Correlate clinically. Previously noted right lower lobe nodule is smaller.    LIVER: Cirrhotic morphology and significant interval decrease in size. No focal hepatic lesion on this noncontrast study.  BILE DUCTS: Normal caliber.  GALLBLADDER: Cholecystectomy.  SPLEEN: Within normal limits.  PANCREAS: Within normal limits.  ADRENALS: Within normal limits.  KIDNEYS/URETERS: Within normal limits.    BLADDER: Within normal limits.  REPRODUCTIVE ORGANS: Uterus and adnexa within normal limits.    BOWEL: No bowel obstruction. Appendix is normal. Uncomplicated diverticulosis.  PERITONEUM: Moderate ascites.  VESSELS: Atherosclerotic changes. Portal venous collaterals although evaluation is limited due to lack of IV contrast.  RETROPERITONEUM/LYMPH NODES: No lymphadenopathy.  ABDOMINAL WALL: Postsurgical changes.  BONES: Degenerative changes.    IMPRESSION:  Cirrhosis, portal hypertension and ascites.  Additional findings as above.    < end of copied text >

## 2021-02-24 NOTE — H&P ADULT - NSHPSOCIALHISTORY_GEN_ALL_CORE
Lives with daughter and son   Non smoker/drinker  Ambulates w/o assistance   Lives in a house, 2nd floor (5 steps to get to her room) - and is able to walk there w/o cane/walker

## 2021-02-24 NOTE — PROGRESS NOTE ADULT - SUBJECTIVE AND OBJECTIVE BOX
Diagnosis: Ascites    Procedure: Paracentesis    : Jeffrey Mejia MD    Contrast: None    Anesthesia: 1% Lidocaine Subcutaneous    Estimated Blood Loss: Less than 10cc    Specimens: Identified, Labeled, Confirmed and Sent to Lab.    Complications: No Immediate Complications    Anticoagulation: Resume in 24 Hours    Findings & Plan: RLQ Paracentesis performed w 5F Yueh needle after local anesthesia under US guidance. 2100cc of clear yellow fluid drained from abdomen.       Please call Interventional Radiology with any questions, concerns, or issues.

## 2021-02-24 NOTE — ED PROVIDER NOTE - CLINICAL SUMMARY MEDICAL DECISION MAKING FREE TEXT BOX
The patient presents with abdominal distention and unable to eat for 3 days and will admit for further evaluation

## 2021-02-24 NOTE — H&P ADULT - HISTORY OF PRESENT ILLNESS
Patient is a 89 y/o woman with pmhx of hypertension, gout, cad s/p PCI x 2 (2011, 2013), h/o UTIs, cryptogenic cirrhosis, Breast cancer (s/p right sided lumpectomy 2001 s/p RT and then left sided in 2005 s/p mastectomy), gastric ulcer  and hyperlipidemia who presents with c/o abdominal distention/discomfort x 5 days. Per patient, she noted that about 5 days ago, she was having difficulty with wearing her clothes (felt tight) and noted that her abdomen was swollen and uncomfortable. She also c/o decreased appetite around that time with discomfort when she swallowed anything. Therefore, she has eaten remarkably less in the last 3-5 days. + dysuria, no increased frequency, + diarrhea x 3 days. Per daughter, mother had a covid vaccine recently and thought that the diarrhea was due to the vaccine. Her HCTZ was held for a few weeks a month ago, and then was resumed about 1.5 weeks ago. Patient went to see Dr. Ritchie Horton for w/up of new onset ascites. An OP US of the abdomen was done which showed a large amount of fluid and she was asked to come to the ER. No recent fevers or weight loss. Denies nausea/vomiting

## 2021-02-24 NOTE — GOALS OF CARE CONVERSATION - ADVANCED CARE PLANNING - CONVERSATION DETAILS
Advanced care planning discussion done with family. Discussed wishes if heart or lungs were to fail with respect to chest compressions and intubation. In addition, bipap/invasive measures and withholding care that may not be life saving were also discussed.     Per daughter, she wants to talk to her siblings first. They have not yet had this discussion and she would like us to speak to her mother also when she is able to talk.     Explained that for now, full code status would be in place

## 2021-02-24 NOTE — CONSULT NOTE ADULT - ASSESSMENT
Likely KENT or cardiac cirrhosis with decompensation.  Recommend diagnostic and therapeutic paracentesis, sending fluid for study, and optimizing diuretics.  How aggressive the care given to this 89-year-old woman will be dependent on patient's preference.  Based on my conversation with her, it does not seem that she would be interested in a hospitalization longer than 1 day.  The etiology of her dysphagia and odynophagia is not clear.  I suspect she has candidal esophagitis based on the onset of symptoms and CT images.  Her daughter, who was at the bedside, has serious concerns about her mother receiving anesthesia.      - paracentesis as above  - pantoprazole 40 mg daily  - could consider empiric treatment with fluconazole if dysphagia does not improve with paracentesis and PPI    Will follow up.  Consider palliative care involvement to help establish goals of care.

## 2021-02-24 NOTE — H&P ADULT - NSHPPHYSICALEXAM_GEN_ALL_CORE
Gen: elderly woman, well kempt, nad; no jaundice noted   HEENT: eomi, perrla, mild pallor, non icteric   CVS: S1S2nl no m/r/g RRR   Lungs: anterior exam (after IR procedure) fair b/l air entry   GI: soft, obese, distended, RLQ dressing intact post IR procedure. Percussed w/o guarding  Ext: no c/c/e  Skin: intact anteriorly (did not turn patient over post IR procedure in lab)   Neuro: Aaox2 (name/, thought it was  at first), answered questions appropriately, moved all 4 extremities

## 2021-02-24 NOTE — H&P ADULT - NSICDXPASTMEDICALHX_GEN_ALL_CORE_FT
PAST MEDICAL HISTORY:  Breast Cancer right side 2001  lumpectomy and s/p RT  left side 2005 s/p mastectomy    CAD (Coronary Artery Disease) 12/07    Gastric Ulcer 2009    Hypertension     Insomnia     Stented coronary artery x2 2011 and 2013    UTI (Lower Urinary Tract Infection) last one 6 months ago

## 2021-02-25 DIAGNOSIS — K74.69 OTHER CIRRHOSIS OF LIVER: ICD-10-CM

## 2021-02-25 DIAGNOSIS — R13.10 DYSPHAGIA, UNSPECIFIED: ICD-10-CM

## 2021-02-25 DIAGNOSIS — R18.8 OTHER ASCITES: ICD-10-CM

## 2021-02-25 LAB
A1C WITH ESTIMATED AVERAGE GLUCOSE RESULT: 5.6 % — SIGNIFICANT CHANGE UP (ref 4–5.6)
ALBUMIN FLD-MCNC: 0.2 G/DL — SIGNIFICANT CHANGE UP
ALBUMIN SERPL ELPH-MCNC: 3.1 G/DL — LOW (ref 3.3–5.2)
ALP SERPL-CCNC: 141 U/L — HIGH (ref 40–120)
ALT FLD-CCNC: 32 U/L — SIGNIFICANT CHANGE UP
ANION GAP SERPL CALC-SCNC: 13 MMOL/L — SIGNIFICANT CHANGE UP (ref 5–17)
AST SERPL-CCNC: 47 U/L — HIGH
BASOPHILS # BLD AUTO: 0.06 K/UL — SIGNIFICANT CHANGE UP (ref 0–0.2)
BASOPHILS NFR BLD AUTO: 0.6 % — SIGNIFICANT CHANGE UP (ref 0–2)
BILIRUB SERPL-MCNC: 1 MG/DL — SIGNIFICANT CHANGE UP (ref 0.4–2)
BUN SERPL-MCNC: 27 MG/DL — HIGH (ref 8–20)
CALCIUM SERPL-MCNC: 9.5 MG/DL — SIGNIFICANT CHANGE UP (ref 8.6–10.2)
CHLORIDE SERPL-SCNC: 106 MMOL/L — SIGNIFICANT CHANGE UP (ref 98–107)
CHOLEST SERPL-MCNC: 90 MG/DL — SIGNIFICANT CHANGE UP
CO2 SERPL-SCNC: 21 MMOL/L — LOW (ref 22–29)
CREAT SERPL-MCNC: 1.32 MG/DL — HIGH (ref 0.5–1.3)
EOSINOPHIL # BLD AUTO: 0.15 K/UL — SIGNIFICANT CHANGE UP (ref 0–0.5)
EOSINOPHIL NFR BLD AUTO: 1.4 % — SIGNIFICANT CHANGE UP (ref 0–6)
ESTIMATED AVERAGE GLUCOSE: 114 MG/DL — SIGNIFICANT CHANGE UP (ref 68–114)
GLUCOSE SERPL-MCNC: 89 MG/DL — SIGNIFICANT CHANGE UP (ref 70–99)
HAV IGM SER-ACNC: SIGNIFICANT CHANGE UP
HBV CORE IGM SER-ACNC: SIGNIFICANT CHANGE UP
HBV SURFACE AG SER-ACNC: SIGNIFICANT CHANGE UP
HCT VFR BLD CALC: 38.1 % — SIGNIFICANT CHANGE UP (ref 34.5–45)
HCV AB S/CO SERPL IA: 0.29 S/CO — SIGNIFICANT CHANGE UP (ref 0–0.99)
HCV AB SERPL-IMP: SIGNIFICANT CHANGE UP
HDLC SERPL-MCNC: 43 MG/DL — LOW
HGB BLD-MCNC: 12.4 G/DL — SIGNIFICANT CHANGE UP (ref 11.5–15.5)
IMM GRANULOCYTES NFR BLD AUTO: 0.4 % — SIGNIFICANT CHANGE UP (ref 0–1.5)
LIPID PNL WITH DIRECT LDL SERPL: 33 MG/DL — SIGNIFICANT CHANGE UP
LYMPHOCYTES # BLD AUTO: 1.44 K/UL — SIGNIFICANT CHANGE UP (ref 1–3.3)
LYMPHOCYTES # BLD AUTO: 13.6 % — SIGNIFICANT CHANGE UP (ref 13–44)
MAGNESIUM SERPL-MCNC: 1.3 MG/DL — LOW (ref 1.6–2.6)
MCHC RBC-ENTMCNC: 32.5 GM/DL — SIGNIFICANT CHANGE UP (ref 32–36)
MCHC RBC-ENTMCNC: 34 PG — SIGNIFICANT CHANGE UP (ref 27–34)
MCV RBC AUTO: 104.4 FL — HIGH (ref 80–100)
MONOCYTES # BLD AUTO: 0.86 K/UL — SIGNIFICANT CHANGE UP (ref 0–0.9)
MONOCYTES NFR BLD AUTO: 8.1 % — SIGNIFICANT CHANGE UP (ref 2–14)
NEUTROPHILS # BLD AUTO: 8.02 K/UL — HIGH (ref 1.8–7.4)
NEUTROPHILS NFR BLD AUTO: 75.9 % — SIGNIFICANT CHANGE UP (ref 43–77)
NON HDL CHOLESTEROL: 47 MG/DL — SIGNIFICANT CHANGE UP
OSMOLALITY UR: 593 MOSM/KG — SIGNIFICANT CHANGE UP (ref 300–1000)
PHOSPHATE SERPL-MCNC: 2.9 MG/DL — SIGNIFICANT CHANGE UP (ref 2.4–4.7)
PLATELET # BLD AUTO: 75 K/UL — LOW (ref 150–400)
POTASSIUM SERPL-MCNC: 4.3 MMOL/L — SIGNIFICANT CHANGE UP (ref 3.5–5.3)
POTASSIUM SERPL-SCNC: 4.3 MMOL/L — SIGNIFICANT CHANGE UP (ref 3.5–5.3)
PROT ?TM UR-MCNC: 25 MG/DL — HIGH (ref 0–12)
PROT FLD-MCNC: <1 G/DL — SIGNIFICANT CHANGE UP
PROT SERPL-MCNC: 5.8 G/DL — LOW (ref 6.6–8.7)
RBC # BLD: 3.65 M/UL — LOW (ref 3.8–5.2)
RBC # FLD: 14 % — SIGNIFICANT CHANGE UP (ref 10.3–14.5)
SARS-COV-2 IGG SERPL QL IA: NEGATIVE — SIGNIFICANT CHANGE UP
SARS-COV-2 IGM SERPL IA-ACNC: 0.06 INDEX — SIGNIFICANT CHANGE UP
SODIUM SERPL-SCNC: 140 MMOL/L — SIGNIFICANT CHANGE UP (ref 135–145)
SODIUM UR-SCNC: 180 MMOL/L — SIGNIFICANT CHANGE UP
TRIGL SERPL-MCNC: 69 MG/DL — SIGNIFICANT CHANGE UP
WBC # BLD: 10.57 K/UL — HIGH (ref 3.8–10.5)
WBC # FLD AUTO: 10.57 K/UL — HIGH (ref 3.8–10.5)

## 2021-02-25 PROCEDURE — 71045 X-RAY EXAM CHEST 1 VIEW: CPT | Mod: 26

## 2021-02-25 PROCEDURE — 99232 SBSQ HOSP IP/OBS MODERATE 35: CPT

## 2021-02-25 PROCEDURE — 76775 US EXAM ABDO BACK WALL LIM: CPT | Mod: 26

## 2021-02-25 PROCEDURE — 99233 SBSQ HOSP IP/OBS HIGH 50: CPT

## 2021-02-25 RX ORDER — NYSTATIN 500MM UNIT
500000 POWDER (EA) MISCELLANEOUS
Refills: 0 | Status: DISCONTINUED | OUTPATIENT
Start: 2021-02-25 | End: 2021-02-27

## 2021-02-25 RX ORDER — SUCRALFATE 1 G
1 TABLET ORAL
Refills: 0 | Status: DISCONTINUED | OUTPATIENT
Start: 2021-02-25 | End: 2021-02-27

## 2021-02-25 RX ORDER — FUROSEMIDE 40 MG
20 TABLET ORAL DAILY
Refills: 0 | Status: DISCONTINUED | OUTPATIENT
Start: 2021-02-25 | End: 2021-02-27

## 2021-02-25 RX ORDER — SPIRONOLACTONE 25 MG/1
25 TABLET, FILM COATED ORAL DAILY
Refills: 0 | Status: DISCONTINUED | OUTPATIENT
Start: 2021-02-25 | End: 2021-02-25

## 2021-02-25 RX ORDER — SPIRONOLACTONE 25 MG/1
100 TABLET, FILM COATED ORAL DAILY
Refills: 0 | Status: COMPLETED | OUTPATIENT
Start: 2021-02-25 | End: 2021-02-26

## 2021-02-25 RX ORDER — MAGNESIUM SULFATE 500 MG/ML
2 VIAL (ML) INJECTION ONCE
Refills: 0 | Status: COMPLETED | OUTPATIENT
Start: 2021-02-25 | End: 2021-02-25

## 2021-02-25 RX ORDER — PANTOPRAZOLE SODIUM 20 MG/1
40 TABLET, DELAYED RELEASE ORAL
Refills: 0 | Status: DISCONTINUED | OUTPATIENT
Start: 2021-02-25 | End: 2021-02-27

## 2021-02-25 RX ADMIN — Medication 1 TABLET(S): at 12:57

## 2021-02-25 RX ADMIN — Medication 50 GRAM(S): at 16:46

## 2021-02-25 RX ADMIN — Medication 81 MILLIGRAM(S): at 12:56

## 2021-02-25 RX ADMIN — PANTOPRAZOLE SODIUM 40 MILLIGRAM(S): 20 TABLET, DELAYED RELEASE ORAL at 22:00

## 2021-02-25 RX ADMIN — Medication 50 MILLIGRAM(S): at 11:07

## 2021-02-25 RX ADMIN — SPIRONOLACTONE 25 MILLIGRAM(S): 25 TABLET, FILM COATED ORAL at 12:56

## 2021-02-25 RX ADMIN — PANTOPRAZOLE SODIUM 40 MILLIGRAM(S): 20 TABLET, DELAYED RELEASE ORAL at 12:58

## 2021-02-25 RX ADMIN — Medication 500000 UNIT(S): at 22:01

## 2021-02-25 RX ADMIN — Medication 500000 UNIT(S): at 16:54

## 2021-02-25 RX ADMIN — CEFTRIAXONE 100 MILLIGRAM(S): 500 INJECTION, POWDER, FOR SOLUTION INTRAMUSCULAR; INTRAVENOUS at 16:46

## 2021-02-25 RX ADMIN — Medication 20 MILLIGRAM(S): at 12:56

## 2021-02-25 RX ADMIN — Medication 1 TABLET(S): at 16:47

## 2021-02-25 RX ADMIN — HEPARIN SODIUM 5000 UNIT(S): 5000 INJECTION INTRAVENOUS; SUBCUTANEOUS at 16:47

## 2021-02-25 RX ADMIN — Medication 1 GRAM(S): at 22:01

## 2021-02-25 RX ADMIN — Medication 1 TABLET(S): at 11:07

## 2021-02-25 RX ADMIN — Medication 200 MILLIGRAM(S): at 16:46

## 2021-02-25 NOTE — PROGRESS NOTE ADULT - SUBJECTIVE AND OBJECTIVE BOX
Patient is a 89y old  Female who presents with a chief complaint of abdominal distention (2021 08:09)    HPI:  Patient is a 89 y/o woman with pmhx of hypertension, gout, cad s/p PCI x 2 (, ), h/o UTIs, cryptogenic cirrhosis, Breast cancer (s/p right sided lumpectomy  s/p RT and then left sided in  s/p mastectomy), gastric ulcer  and hyperlipidemia who presents with c/o abdominal distention/discomfort x 5 days. Per patient, she noted that about 5 days ago, she was having difficulty with wearing her clothes (felt tight) and noted that her abdomen was swollen and uncomfortable. She also c/o decreased appetite around that time with discomfort when she swallowed anything. Therefore, she has eaten remarkably less in the last 3-5 days. + dysuria, no increased frequency, + diarrhea x 3 days. Per daughter, mother had a covid vaccine recently and thought that the diarrhea was due to the vaccine. Her HCTZ was held for a few weeks a month ago, and then was resumed about 1.5 weeks ago. Patient went to see Dr. Ritchie Horton for w/up of new onset ascites. An OP US of the abdomen was done which showed a large amount of fluid and she was asked to come to the ER. No recent fevers or weight loss. Denies nausea/vomiting          (2021 17:53)      Allergies    No Known Allergies    Intolerances        REVIEW OF SYSTEMS:    CONSTITUTIONAL: No fever, weight loss, or fatigue  EYES: No eye pain, visual disturbances, or discharge  ENMT:  No difficulty hearing, tinnitus, vertigo; No sinus or throat pain  NECK: No pain or stiffness  BREASTS: No pain, masses, or nipple discharge  RESPIRATORY: No cough, wheezing, chills or hemoptysis; No shortness of breath  CARDIOVASCULAR: No chest pain, palpitations, dizziness, or leg swelling  GASTROINTESTINAL: No abdominal or epigastric pain. No nausea, vomiting, or hematemesis; No diarrhea or constipation. No melena or hematochezia.  GENITOURINARY: No dysuria, frequency, hematuria, or incontinence  NEUROLOGICAL: No headaches, memory loss, loss of strength, numbness, or tremors  SKIN: No itching, burning, rashes, or lesions   LYMPH NODES: No enlarged glands  ENDOCRINE: No heat or cold intolerance; No hair loss  MUSCULOSKELETAL: No joint pain or swelling; No muscle, back, or extremity pain  PSYCHIATRIC: No depression, anxiety, mood swings, or difficulty sleeping  HEME/LYMPH: No easy bruising, or bleeding gums  ALLERY AND IMMUNOLOGIC: No hives or eczema      Vital Signs Last 24 Hrs  T(C): 37.2 (2021 05:42), Max: 37.2 (2021 05:42)  T(F): 98.9 (2021 05:42), Max: 98.9 (2021 05:42)  HR: 81 (2021 05:42) (71 - 81)  BP: 119/57 (2021 05:42) (100/60 - 142/78)  BP(mean): --  RR: 17 (2021 05:42) (16 - 18)  SpO2: 95% (2021 05:42) (95% - 99%)    PHYSICAL EXAM:    GENERAL: NAD, well-groomed, well-developed  HEAD:  Atraumatic, Normocephalic  EYES: EOMI, PERRLA, conjunctiva and sclera clear  ENMT: No tonsillar erythema, exudates, or enlargement; Moist mucous membranes, Good dentition, No lesions  NECK: Supple, No JVD, Normal thyroid  NERVOUS SYSTEM:  Alert & Oriented X3, Good concentration; Motor Strength 5/5 B/L upper and lower extremities; DTRs 2+ intact and symmetric  CHEST/LUNG: Clear to percussion bilaterally; No rales, rhonchi, wheezing, or rubs  HEART: Regular rate and rhythm; No murmurs, rubs, or gallops  ABDOMEN: Soft, Nontender, Nondistended; Bowel sounds present  EXTREMITIES:  2+ Peripheral Pulses, No clubbing, cyanosis, or edema  LYMPH: No lymphadenopathy noted  SKIN: No rashes or lesions      LABS:                        12.4   10.57 )-----------( 75       ( 2021 02:50 )             38.1         140  |  106  |  27.0<H>  ----------------------------<  89  4.3   |  21.0<L>  |  1.32<H>    Ca    9.5      2021 02:50  Phos  2.9       Mg     1.3         TPro  5.8<L>  /  Alb  3.1<L>  /  TBili  1.0  /  DBili  x   /  AST  47<H>  /  ALT  32  /  AlkPhos  141<H>      PT/INR - ( 2021 13:29 )   PT: 15.9 sec;   INR: 1.39 ratio         PTT - ( 2021 13:29 )  PTT:31.8 sec  Urinalysis Basic - ( 2021 13:57 )    Color: Yellow / Appearance: Clear / S.015 / pH: x  Gluc: x / Ketone: Negative  / Bili: Negative / Urobili: Negative   Blood: x / Protein: Negative / Nitrite: Positive   Leuk Esterase: Small / RBC: 0-2 /HPF / WBC 0-2   Sq Epi: x / Non Sq Epi: Few / Bacteria: Few        RADIOLOGY & ADDITIONAL TESTS:   Patient is a 89y old  Female who presents with a chief complaint of abdominal distention     pt is seen in am     HPI:  Patient is a 89 y/o woman with pmhx of hypertension, gout, cad s/p PCI x 2 (, ), h/o UTIs, cryptogenic cirrhosis, Breast cancer (s/p right sided lumpectomy  s/p RT and then left sided in  s/p mastectomy), gastric ulcer  and hyperlipidemia who presents with c/o abdominal distention/discomfort x 5 days. Per patient, she noted that about 5 days ago, she was having difficulty with wearing her clothes (felt tight) and noted that her abdomen was swollen and uncomfortable. She also c/o decreased appetite around that time with discomfort when she swallowed anything. Therefore, she has eaten remarkably less in the last 3-5 days. + dysuria, no increased frequency, + diarrhea x 3 days. Per daughter, mother had a covid vaccine recently and thought that the diarrhea was due to the vaccine. Her HCTZ was held for a few weeks a month ago, and then was resumed about 1.5 weeks ago. Patient went to see Dr. Ritchie Horton for w/up of new onset ascites. An OP US of the abdomen was done which showed a large amount of fluid and she was asked to come to the ER. No recent fevers or weight loss. Denies nausea/vomiting          (2021 17:53)      Allergies    No Known Allergies    Intolerances        REVIEW OF SYSTEMS:  as above , otherwise neg     Vital Signs Last 24 Hrs  T(C): 37.2 (2021 05:42), Max: 37.2 (2021 05:42)  T(F): 98.9 (2021 05:42), Max: 98.9 (2021 05:42)  HR: 81 (2021 05:42) (71 - 81)  BP: 119/57 (2021 05:42) (100/60 - 142/78)  BP(mean): --  RR: 17 (2021 05:42) (16 - 18)  SpO2: 95% (2021 05:42) (95% - 99%)    PHYSICAL EXAM:      LABS:                        12.4   10.57 )-----------( 75       ( 2021 02:50 )             38.1         140  |  106  |  27.0<H>  ----------------------------<  89  4.3   |  21.0<L>  |  1.32<H>    Ca    9.5      2021 02:50  Phos  2.9       Mg     1.3         TPro  5.8<L>  /  Alb  3.1<L>  /  TBili  1.0  /  DBili  x   /  AST  47<H>  /  ALT  32  /  AlkPhos  141<H>      PT/INR - ( 2021 13:29 )   PT: 15.9 sec;   INR: 1.39 ratio         PTT - ( 2021 13:29 )  PTT:31.8 sec  Urinalysis Basic - ( 2021 13:57 )    Color: Yellow / Appearance: Clear / S.015 / pH: x  Gluc: x / Ketone: Negative  / Bili: Negative / Urobili: Negative   Blood: x / Protein: Negative / Nitrite: Positive   Leuk Esterase: Small / RBC: 0-2 /HPF / WBC 0-2   Sq Epi: x / Non Sq Epi: Few / Bacteria: Few        RADIOLOGY & ADDITIONAL TESTS:   Patient is a 89y old  Female who presents with a chief complaint of abdominal distention     pt is seen in am , feeling well, denies SB , no CP     no overnight events reported, chart reviewed   GI follow up appreciated     REVIEW OF SYSTEMS:  as above , otherwise neg     Vital Signs Last 24 Hrs  T(C): 37.2 (2021 05:42), Max: 37.2 (2021 05:42)  T(F): 98.9 (2021 05:42), Max: 98.9 (2021 05:42)  HR: 81 (2021 05:42) (71 - 81)  BP: 119/57 (2021 05:42) (100/60 - 142/78)  BP(mean): --  RR: 17 (2021 05:42) (16 - 18)  SpO2: 95% (2021 05:42) (95% - 99%)    PHYSICAL EXAM:  General : no distress , awake alert   Neck supple, no JVD   Pulmonary : CTA bilateral  Cardio : regular s1 /s2   Abd: soft no tenderness , BS positive , mild distention   Ext : no pretibial edema           LABS:                        12.4   10.57 )-----------( 75       ( 2021 02:50 )             38.1         140  |  106  |  27.0<H>  ----------------------------<  89  4.3   |  21.0<L>  |  1.32<H>    Ca    9.5      2021 02:50  Phos  2.9       Mg     1.3         TPro  5.8<L>  /  Alb  3.1<L>  /  TBili  1.0  /  DBili  x   /  AST  47<H>  /  ALT  32  /  AlkPhos  141<H>      PT/INR - ( 2021 13:29 )   PT: 15.9 sec;   INR: 1.39 ratio         PTT - ( 2021 13:29 )  PTT:31.8 sec  Urinalysis Basic - ( 2021 13:57 )    Color: Yellow / Appearance: Clear / S.015 / pH: x  Gluc: x / Ketone: Negative  / Bili: Negative / Urobili: Negative   Blood: x / Protein: Negative / Nitrite: Positive   Leuk Esterase: Small / RBC: 0-2 /HPF / WBC 0-2   Sq Epi: x / Non Sq Epi: Few / Bacteria: Few        RADIOLOGY & ADDITIONAL TESTS:

## 2021-02-25 NOTE — SWALLOW BEDSIDE ASSESSMENT ADULT - COMMENTS
As per MD note, "Patient is a 89 y/o woman with pmhx of hypertension, gout, cad s/p PCI x 2 (2011, 2013), h/o UTIs, cryptogenic cirrhosis, Breast cancer (s/p right sided lumpectomy 2001 s/p RT and then left sided in 2005 s/p mastectomy), gastric ulcer  and hyperlipidemia who presents with c/o abdominal distention/discomfort x 5 days. Per patient, she noted that about 5 days ago, she was having difficulty with wearing her clothes (felt tight) and noted that her abdomen was swollen and uncomfortable. She also c/o decreased appetite around that time with discomfort when she swallowed anything. Therefore, she has eaten remarkably less in the last 3-5 days. + dysuria, no increased frequency, + diarrhea x 3 days. Per daughter, mother had a covid vaccine recently and thought that the diarrhea was due to the vaccine. Her HCTZ was held for a few weeks a month ago, and then was resumed about 1.5 weeks ago. Patient went to see Dr. Ritchie Horton for w/up of new onset ascites. An OP US of the abdomen was done which showed a large amount of fluid and she was asked to come to the ER. No recent fevers or weight loss. Denies nausea/vomiting. Now s/p paracentesis 2.1 liters of fluid removed". Pt reporting significant odynophagia & additional globus sensation post intake, +preference for purees

## 2021-02-25 NOTE — PHYSICAL THERAPY INITIAL EVALUATION ADULT - ADDITIONAL COMMENTS
Pt reports ambulating with a cane prior to being administered to hospital.  Pt states she also owns  a RW which she does not use.  Pt has 5STE her ranch style home, and 5-6 more steps b/l HR to her second floor bedroom.  Pt lives with her daughter who is retired but occasionally picks up shifts, and her  who is also retired.  Both are willing to assist upon discharge.

## 2021-02-25 NOTE — PROGRESS NOTE ADULT - SUBJECTIVE AND OBJECTIVE BOX
Pt seen and examined f/u for cryptogenic cirrhosis and new onset ascites    This AM she feels OK with no specific complaints. Had paracentesis yesterday with 2.1 liters removed and no evidenc of SBP and A/a gradient greater than 1.1 c/w portal hypertension and cirrhotic ascites. Still notes that her throat frequently closes up when she tries to swallow and she has throat pain at the same time.    REVIEW OF SYSTEMS:    CONSTITUTIONAL: No fever, weight loss, or fatigue  EYES: No eye pain, visual disturbances, or discharge  ENMT:  No difficulty hearing, tinnitus, vertigo; No sinus or throat pain  RESPIRATORY: No cough, wheezing, chills or hemoptysis; No shortness of breath  CARDIOVASCULAR: No chest pain, palpitations, dizziness, or leg swelling  GASTROINTESTINAL: No abdominal or epigastric pain. No nausea, vomiting, or hematemesis; No diarrhea or constipation. No melena or hematochezia.    MEDICATIONS:  MEDICATIONS  (STANDING):  allopurinol 200 milliGRAM(s) Oral daily  aspirin enteric coated 81 milliGRAM(s) Oral daily  cefTRIAXone   IVPB 1000 milliGRAM(s) IV Intermittent every 24 hours  cefTRIAXone   IVPB      furosemide    Tablet 20 milliGRAM(s) Oral daily  heparin   Injectable 5000 Unit(s) SubCutaneous every 12 hours  lactobacillus acidophilus 1 Tablet(s) Oral two times a day  metoprolol succinate ER 50 milliGRAM(s) Oral daily  multivitamin 1 Tablet(s) Oral daily  pantoprazole  Injectable 40 milliGRAM(s) IV Push daily  spironolactone 100 milliGRAM(s) Oral daily    MEDICATIONS  (PRN):  acetaminophen   Tablet .. 650 milliGRAM(s) Oral every 6 hours PRN Temp greater or equal to 38C (100.4F), Mild Pain (1 - 3)  ondansetron Injectable 4 milliGRAM(s) IV Push every 6 hours PRN Nausea      Allergies    No Known Allergies    Intolerances        Vital Signs Last 24 Hrs  T(C): 36.8 (2021 11:06), Max: 37.2 (2021 05:42)  T(F): 98.3 (2021 11:06), Max: 98.9 (2021 05:42)  HR: 82 (2021 12:53) (70 - 82)  BP: 155/74 (2021 12:53) (100/60 - 155/74)  BP(mean): --  RR: 17 (2021 11:06) (16 - 18)  SpO2: 97% (2021 11:06) (95% - 99%)     @ 07:01  -   @ 07:00  --------------------------------------------------------  IN: 1000 mL / OUT: 0 mL / NET: 1000 mL        PHYSICAL EXAM:    General: Well developed; well nourished; in no acute distress  HEENT: MMM, conjunctiva and sclera clear  Gastrointestinal:Abdomen: Soft non-tender non-distended; Normal bowel sounds; No hepatosplenomegaly  Extremities: no cyanosis, clubbing or edema.  Skin: Warm and dry. No obvious rash    LABS:      CBC Full  -  ( 2021 02:50 )  WBC Count : 10.57 K/uL  RBC Count : 3.65 M/uL  Hemoglobin : 12.4 g/dL  Hematocrit : 38.1 %  Platelet Count - Automated : 75 K/uL  Mean Cell Volume : 104.4 fl  Mean Cell Hemoglobin : 34.0 pg  Mean Cell Hemoglobin Concentration : 32.5 gm/dL  Auto Neutrophil # : 8.02 K/uL  Auto Lymphocyte # : 1.44 K/uL  Auto Monocyte # : 0.86 K/uL  Auto Eosinophil # : 0.15 K/uL  Auto Basophil # : 0.06 K/uL  Auto Neutrophil % : 75.9 %  Auto Lymphocyte % : 13.6 %  Auto Monocyte % : 8.1 %  Auto Eosinophil % : 1.4 %  Auto Basophil % : 0.6 %        140  |  106  |  27.0<H>  ----------------------------<  89  4.3   |  21.0<L>  |  1.32<H>    Ca    9.5      2021 02:50  Phos  2.9       Mg     1.3         TPro  5.8<L>  /  Alb  3.1<L>  /  TBili  1.0  /  DBili  x   /  AST  47<H>  /  ALT  32  /  AlkPhos  141<H>  02-25    PT/INR - ( 2021 13:29 )   PT: 15.9 sec;   INR: 1.39 ratio         PTT - ( 2021 13:29 )  PTT:31.8 sec      Urinalysis Basic - ( 2021 13:57 )    Color: Yellow / Appearance: Clear / S.015 / pH: x  Gluc: x / Ketone: Negative  / Bili: Negative / Urobili: Negative   Blood: x / Protein: Negative / Nitrite: Positive   Leuk Esterase: Small / RBC: 0-2 /HPF / WBC 0-2   Sq Epi: x / Non Sq Epi: Few / Bacteria: Few

## 2021-02-25 NOTE — SWALLOW BEDSIDE ASSESSMENT ADULT - SLP GENERAL OBSERVATIONS
Pt recd awake & upright in bed, +A&A Ox3, 0/10 pain pre and post, tolerating RA without overt distress

## 2021-02-25 NOTE — PROGRESS NOTE ADULT - PROBLEM SELECTOR PLAN 3
she has throat pain while swallowing and her throat initially closes up when she tries to swallow more c/w oropharyngeal problems than esophageal dysphagia. Will add nystatin swish and swallow. she has throat pain while swallowing and her throat initially closes up when she tries to swallow more c/w oropharyngeal problems than esophageal dysphagia. Will add nystatin swish and swallow. Will also increase pantoprazole to twice daily.

## 2021-02-25 NOTE — SWALLOW BEDSIDE ASSESSMENT ADULT - SWALLOW EVAL: DIAGNOSIS
Pt presents w/functional oropharyngeal swallow, w/no s/s penetration or aspiration noted across consistencies at the bedside. Pt w/reported odynophagia & additional globus sensation post intake of mechanical soft solids, though improved & alleviation w/puree. Unsure if esophageal dysphagia is present, however do not suspect aspiration at this time.

## 2021-02-25 NOTE — PHYSICAL THERAPY INITIAL EVALUATION ADULT - ACTIVE RANGE OF MOTION EXAMINATION, REHAB EVAL
Left UE Active ROM was WNL (within normal limits)/Right UE Active ROM was WNL (within normal limits)/LLE Active ROM was WNL (within normal limits)/RLE Active ROM was WNL (within normal limits)

## 2021-02-25 NOTE — CONSULT NOTE ADULT - SUBJECTIVE AND OBJECTIVE BOX
Patient is a 89y old  Female who presents with a chief complaint of abdominal distention (2021 17:53)   Acute  renal failure.    HPI:  Patient is a 89 y/o woman with pmhx of hypertension, gout, cad s/p PCI x 2 (, ), h/o UTIs, cryptogenic cirrhosis, Breast cancer (s/p right sided lumpectomy  s/p RT and then left sided in  s/p mastectomy), gastric ulcer  and hyperlipidemia who presents with c/o abdominal distention/discomfort x 5 days. Per patient, she noted that about 5 days ago, she was having difficulty with wearing her clothes (felt tight) and noted that her abdomen was swollen and uncomfortable. She also c/o decreased appetite around that time with discomfort when she swallowed anything. Therefore, she has eaten remarkably less in the last 3-5 days. + dysuria, no increased frequency, + diarrhea x 3 days. Per daughter, mother had a covid vaccine recently and thought that the diarrhea was due to the vaccine. Her HCTZ was held for a few weeks a month ago, and then was resumed about 1.5 weeks ago. Patient went to see Dr. Ritchie Horton for w/up of new onset ascites. An OP US of the abdomen was done which showed a large amount of fluid and she was asked to come to the ER. No recent fevers or weight loss. Denies nausea/vomiting          (2021 17:53)   Hx renal stones x1 not aware of type, no other renal  problems; elevated creatinine on admission.    PAST MEDICAL & SURGICAL HISTORY:  Stented coronary artery  x2  and     UTI (Lower Urinary Tract Infection)  last one 6 months ago    Breast Cancer  right side 2001  lumpectomy and s/p RT  left side 2005 s/p mastectomy    Gastric Ulcer  2009    CAD (Coronary Artery Disease)      Hypertension    Insomnia    History of Coronary Angioplasty  1 stent placed     Hernia  ventral hernia repaired x2  and     left   with reconstruction, first implant, got infected then removed on left, tram flap done on left  axillary node dissection  had lymphedema    right lumpectomy   with sentinal node biopsy    Stress Incontinence  had bladder lift x2 30 years ago and 28 years ago        FAMILY HISTORY:  No pertinent family history in first degree relatives    [place]    Social History:Non smoker    MEDICATIONS  (STANDING):  allopurinol 200 milliGRAM(s) Oral daily  aspirin enteric coated 81 milliGRAM(s) Oral daily  cefTRIAXone   IVPB 1000 milliGRAM(s) IV Intermittent every 24 hours  cefTRIAXone   IVPB      heparin   Injectable 5000 Unit(s) SubCutaneous every 12 hours  lactobacillus acidophilus 1 Tablet(s) Oral two times a day  metoprolol succinate ER 50 milliGRAM(s) Oral daily  multivitamin 1 Tablet(s) Oral daily  pantoprazole  Injectable 40 milliGRAM(s) IV Push daily    MEDICATIONS  (PRN):  acetaminophen   Tablet .. 650 milliGRAM(s) Oral every 6 hours PRN Temp greater or equal to 38C (100.4F), Mild Pain (1 - 3)  ondansetron Injectable 4 milliGRAM(s) IV Push every 6 hours PRN Nausea   Meds reviewed    Allergies    No Known Allergies        REVIEW OF SYSTEMS:    CONSTITUTIONAL:  sob, weight gain,   EYES: No eye pain, visual disturbances, or discharge  ENMT:  No difficulty hearing, tinnitus, vertigo; No sinus or throat pain  NECK: No pain or stiffness  BREASTS: No pain, masses, or nipple discharge  RESPIRATORY: Neg  CARDIOVASCULAR: No chest pain, palpitations, dizziness,   GASTROINTESTINAL: + abdominal  pain. No nausea, vomiting, or hematemesis; No diarrhea or constipation. Distention.  GENITOURINARY: + dysuria, frequency,  NEUROLOGICAL: No headaches, memory loss, loss of strength, numbness, or tremors  SKIN: Diffuse erythema, no blisters  LYMPH NODES: No enlarged glands  ENDOCRINE: No heat or cold intolerance; No hair loss  MUSCULOSKELETAL: OA  PSYCHIATRIC: No depression, anxiety, mood swings, or difficulty sleeping  HEME/LYMPH: No easy bruising, or bleeding gums  ALLERY AND IMMUNOLOGIC: No hives or eczema      Vital Signs Last 24 Hrs  T(C): 37.2 (2021 05:42), Max: 37.2 (2021 05:42)  T(F): 98.9 (2021 05:42), Max: 98.9 (2021 05:42)  HR: 81 (2021 05:42) (69 - 81)  BP: 119/57 (2021 05:42) (100/60 - 142/78)  BP(mean): --  RR: 17 (2021 05:42) (16 - 18)  SpO2: 95% (2021 05:42) (94% - 99%)  Daily Height in cm: 154.94 (2021 08:55)         PHYSICAL EXAM:    GENERAL: appears chronically ill, oriented.  HEAD:  Atraumatic, Normocephalic  EYES: EOMI, PERRLA, conjunctiva and sclera clear  ENMT: No tonsillar erythema, exudates, or enlargement; Moist mucous membranes,   NECK: Supple, neck  veins wnl  NERVOUS SYSTEM:  Alert & Oriented ,; Motor Strength wnl upper and lower extremities;  CHEST/LUNG: Clear to percussion bilaterally; No rales, rhonchi, wheezing, or rubs; Right breast implant , left scar  HEART: Regular rate and rhythm; No  rubs, or gallops; + pacer left chest wall  ABDOMEN: Soft,  Nondistended; Bowel sounds present, body wall and flank edema with dilated veins upper abdominal wall, small dress RLQ  EXTREMITIES:  OA  LYMPH: No lymphadenopathy noted  SKIN: No rashes or lesions, pale  : Neg    LABS:                        12.4   10.57 )-----------( 75       ( 2021 02:50 )             38.1     0225    140  |  106  |  27.0<H>  ----------------------------<  89  4.3   |  21.0<L>  |  1.32<H>    Ca    9.5      2021 02:50  Phos  2.9       Mg     1.3         TPro  5.8<L>  /  Alb  3.1<L>  /  TBili  1.0  /  DBili  x   /  AST  47<H>  /  ALT  32  /  AlkPhos  141<H>  02-25    PT/INR - ( 2021 13:29 )   PT: 15.9 sec;   INR: 1.39 ratio         PTT - ( 2021 13:29 )  PTT:31.8 sec  Urinalysis Basic - ( 2021 13:57 )    Color: Yellow / Appearance: Clear / S.015 / pH: x  Gluc: x / Ketone: Negative  / Bili: Negative / Urobili: Negative   Blood: x / Protein: Negative / Nitrite: Positive   Leuk Esterase: Small / RBC: 0-2 /HPF / WBC 0-2   Sq Epi: x / Non Sq Epi: Few / Bacteria: Few      Magnesium, Serum: 1.3 mg/dL ( @ 02:50)  Phosphorus Level, Serum: 2.9 mg/dL ( @ 02:50)          RADIOLOGY & ADDITIONAL TESTS:

## 2021-02-26 LAB
ALBUMIN SERPL ELPH-MCNC: 2.7 G/DL — LOW (ref 3.3–5.2)
ALP SERPL-CCNC: 111 U/L — SIGNIFICANT CHANGE UP (ref 40–120)
ALT FLD-CCNC: 23 U/L — SIGNIFICANT CHANGE UP
ANION GAP SERPL CALC-SCNC: 10 MMOL/L — SIGNIFICANT CHANGE UP (ref 5–17)
APPEARANCE UR: CLEAR — SIGNIFICANT CHANGE UP
AST SERPL-CCNC: 31 U/L — SIGNIFICANT CHANGE UP
BILIRUB SERPL-MCNC: 0.9 MG/DL — SIGNIFICANT CHANGE UP (ref 0.4–2)
BILIRUB UR-MCNC: NEGATIVE — SIGNIFICANT CHANGE UP
BUN SERPL-MCNC: 32 MG/DL — HIGH (ref 8–20)
CALCIUM SERPL-MCNC: 9.2 MG/DL — SIGNIFICANT CHANGE UP (ref 8.6–10.2)
CHLORIDE SERPL-SCNC: 107 MMOL/L — SIGNIFICANT CHANGE UP (ref 98–107)
CO2 SERPL-SCNC: 21 MMOL/L — LOW (ref 22–29)
COLOR SPEC: YELLOW — SIGNIFICANT CHANGE UP
CREAT SERPL-MCNC: 1.55 MG/DL — HIGH (ref 0.5–1.3)
CULTURE RESULTS: SIGNIFICANT CHANGE UP
DIFF PNL FLD: ABNORMAL
EPI CELLS # UR: ABNORMAL
FERRITIN SERPL-MCNC: 392 NG/ML — HIGH (ref 15–150)
GLUCOSE SERPL-MCNC: 118 MG/DL — HIGH (ref 70–99)
GLUCOSE UR QL: NEGATIVE MG/DL — SIGNIFICANT CHANGE UP
HCT VFR BLD CALC: 33.9 % — LOW (ref 34.5–45)
HGB BLD-MCNC: 11.3 G/DL — LOW (ref 11.5–15.5)
IRON SATN MFR SERPL: 21 % — SIGNIFICANT CHANGE UP (ref 14–50)
IRON SATN MFR SERPL: 56 UG/DL — SIGNIFICANT CHANGE UP (ref 37–145)
KETONES UR-MCNC: NEGATIVE — SIGNIFICANT CHANGE UP
LEUKOCYTE ESTERASE UR-ACNC: ABNORMAL
MCHC RBC-ENTMCNC: 33.3 GM/DL — SIGNIFICANT CHANGE UP (ref 32–36)
MCHC RBC-ENTMCNC: 34 PG — SIGNIFICANT CHANGE UP (ref 27–34)
MCV RBC AUTO: 102.1 FL — HIGH (ref 80–100)
NITRITE UR-MCNC: NEGATIVE — SIGNIFICANT CHANGE UP
PH UR: 5 — SIGNIFICANT CHANGE UP (ref 5–8)
PLATELET # BLD AUTO: 72 K/UL — LOW (ref 150–400)
POTASSIUM SERPL-MCNC: 4 MMOL/L — SIGNIFICANT CHANGE UP (ref 3.5–5.3)
POTASSIUM SERPL-SCNC: 4 MMOL/L — SIGNIFICANT CHANGE UP (ref 3.5–5.3)
PROT SERPL-MCNC: 4.9 G/DL — LOW (ref 6.6–8.7)
PROT UR-MCNC: NEGATIVE MG/DL — SIGNIFICANT CHANGE UP
RBC # BLD: 3.32 M/UL — LOW (ref 3.8–5.2)
RBC # FLD: 13.9 % — SIGNIFICANT CHANGE UP (ref 10.3–14.5)
RBC CASTS # UR COMP ASSIST: SIGNIFICANT CHANGE UP /HPF (ref 0–4)
SODIUM SERPL-SCNC: 138 MMOL/L — SIGNIFICANT CHANGE UP (ref 135–145)
SP GR SPEC: 1.01 — SIGNIFICANT CHANGE UP (ref 1.01–1.02)
SPECIMEN SOURCE: SIGNIFICANT CHANGE UP
TIBC SERPL-MCNC: 270 UG/DL — SIGNIFICANT CHANGE UP (ref 220–430)
TRANSFERRIN SERPL-MCNC: 189 MG/DL — LOW (ref 192–382)
TSH SERPL-MCNC: 1.69 UIU/ML — SIGNIFICANT CHANGE UP (ref 0.27–4.2)
URATE SERPL-MCNC: 4.7 MG/DL — SIGNIFICANT CHANGE UP (ref 2.4–5.7)
UROBILINOGEN FLD QL: NEGATIVE MG/DL — SIGNIFICANT CHANGE UP
WBC # BLD: 12.75 K/UL — HIGH (ref 3.8–10.5)
WBC # FLD AUTO: 12.75 K/UL — HIGH (ref 3.8–10.5)
WBC UR QL: SIGNIFICANT CHANGE UP

## 2021-02-26 PROCEDURE — 99232 SBSQ HOSP IP/OBS MODERATE 35: CPT

## 2021-02-26 PROCEDURE — 99233 SBSQ HOSP IP/OBS HIGH 50: CPT

## 2021-02-26 RX ORDER — SPIRONOLACTONE 25 MG/1
50 TABLET, FILM COATED ORAL DAILY
Refills: 0 | Status: DISCONTINUED | OUTPATIENT
Start: 2021-02-27 | End: 2021-02-27

## 2021-02-26 RX ORDER — MAGNESIUM SULFATE 500 MG/ML
2 VIAL (ML) INJECTION
Refills: 0 | Status: COMPLETED | OUTPATIENT
Start: 2021-02-26 | End: 2021-02-26

## 2021-02-26 RX ADMIN — Medication 200 MILLIGRAM(S): at 12:48

## 2021-02-26 RX ADMIN — Medication 1 GRAM(S): at 05:45

## 2021-02-26 RX ADMIN — Medication 20 MILLIGRAM(S): at 05:45

## 2021-02-26 RX ADMIN — Medication 50 MILLIGRAM(S): at 05:45

## 2021-02-26 RX ADMIN — PANTOPRAZOLE SODIUM 40 MILLIGRAM(S): 20 TABLET, DELAYED RELEASE ORAL at 16:47

## 2021-02-26 RX ADMIN — Medication 81 MILLIGRAM(S): at 12:47

## 2021-02-26 RX ADMIN — Medication 1 TABLET(S): at 16:47

## 2021-02-26 RX ADMIN — Medication 500000 UNIT(S): at 05:45

## 2021-02-26 RX ADMIN — PANTOPRAZOLE SODIUM 40 MILLIGRAM(S): 20 TABLET, DELAYED RELEASE ORAL at 05:45

## 2021-02-26 RX ADMIN — Medication 500000 UNIT(S): at 12:47

## 2021-02-26 RX ADMIN — Medication 50 GRAM(S): at 14:06

## 2021-02-26 RX ADMIN — SPIRONOLACTONE 100 MILLIGRAM(S): 25 TABLET, FILM COATED ORAL at 05:45

## 2021-02-26 RX ADMIN — Medication 1 TABLET(S): at 05:45

## 2021-02-26 RX ADMIN — Medication 50 GRAM(S): at 14:59

## 2021-02-26 RX ADMIN — HEPARIN SODIUM 5000 UNIT(S): 5000 INJECTION INTRAVENOUS; SUBCUTANEOUS at 05:45

## 2021-02-26 RX ADMIN — Medication 1 GRAM(S): at 16:47

## 2021-02-26 RX ADMIN — CEFTRIAXONE 100 MILLIGRAM(S): 500 INJECTION, POWDER, FOR SOLUTION INTRAMUSCULAR; INTRAVENOUS at 16:47

## 2021-02-26 RX ADMIN — Medication 500000 UNIT(S): at 16:47

## 2021-02-26 RX ADMIN — HEPARIN SODIUM 5000 UNIT(S): 5000 INJECTION INTRAVENOUS; SUBCUTANEOUS at 16:47

## 2021-02-26 RX ADMIN — Medication 1 TABLET(S): at 12:47

## 2021-02-26 NOTE — PROGRESS NOTE ADULT - SUBJECTIVE AND OBJECTIVE BOX
Patient seen and examined; chart reviewed.  S/p LVP of 2 liters of clear yellow fluid.  Low fluid WBC: 290 c 9% polys.  C/s of fluid is neg.  Aa Gradient: 2.9 is c/w a low protein ascites ie from cirrhosis.    Doing OK on modest doses of diuretics.  Aldactone 100 and Lasix 20.  Renal function is slipping.    Denies abdominal pain, fever or distention.      PAST MEDICAL & SURGICAL HISTORY:  Stented coronary artery  x2 2011 and 2013    UTI (Lower Urinary Tract Infection)  last one 6 months ago    Breast Cancer  right side 2001  lumpectomy and s/p RT  left side 2005 s/p mastectomy    Gastric Ulcer  2009    CAD (Coronary Artery Disease)  12/07    Hypertension    Insomnia    History of Coronary Angioplasty  1 stent placed 12/07    Hernia  ventral hernia repaired x2 2005 and 2006    left  2005 with reconstruction, first implant, got infected then removed on left, tram flap done on left  axillary node dissection  had lymphedema    right lumpectomy  2001 with sentinal node biopsy    Stress Incontinence  had bladder lift x2 30 years ago and 28 years ago        ROS:  No Heartburn, regurgitation, dysphagia, odynophagia.  No dyspepsia  No abdominal pain.    No Nausea, vomiting.  No Bleeding.  No hematemesis.   No diarrhea.    No hematochesia.  No weight loss, anorexia.  No edema.      MEDICATIONS  (STANDING):  allopurinol 200 milliGRAM(s) Oral daily  aspirin enteric coated 81 milliGRAM(s) Oral daily  cefTRIAXone   IVPB 1000 milliGRAM(s) IV Intermittent every 24 hours  cefTRIAXone   IVPB      furosemide    Tablet 20 milliGRAM(s) Oral daily  heparin   Injectable 5000 Unit(s) SubCutaneous every 12 hours  lactobacillus acidophilus 1 Tablet(s) Oral two times a day  metoprolol succinate ER 50 milliGRAM(s) Oral daily  multivitamin 1 Tablet(s) Oral daily  nystatin    Suspension [id number] Unit(s) Oral four times a day  pantoprazole  Injectable 40 milliGRAM(s) IV Push two times a day  sucralfate suspension 1 Gram(s) Oral two times a day    MEDICATIONS  (PRN):  acetaminophen   Tablet .. 650 milliGRAM(s) Oral every 6 hours PRN Temp greater or equal to 38C (100.4F), Mild Pain (1 - 3)  ondansetron Injectable 4 milliGRAM(s) IV Push every 6 hours PRN Nausea      Allergies    No Known Allergies    Intolerances        Vital Signs Last 24 Hrs  T(C): 36.7 (26 Feb 2021 11:24), Max: 36.8 (26 Feb 2021 05:11)  T(F): 98 (26 Feb 2021 11:24), Max: 98.3 (26 Feb 2021 05:11)  HR: 80 (26 Feb 2021 11:24) (78 - 85)  BP: 99/68 (26 Feb 2021 11:24) (99/68 - 133/57)  BP(mean): --  RR: 18 (26 Feb 2021 11:24) (18 - 18)  SpO2: 96% (26 Feb 2021 11:24) (93% - 96%)    PHYSICAL EXAM:    GENERAL: NAD, well-groomed, well-developed  HEAD:  Atraumatic, Normocephalic  EYES: EOMI, PERRLA, conjunctiva and sclera clear  ENMT: No tonsillar erythema, exudates, or enlargement; Moist mucous membranes, Good dentition, No lesions  NECK: Supple, No JVD, Normal thyroid  CHEST/LUNG: Clear to percussion bilaterally; No rales, rhonchi, wheezing, or rubs  HEART: Regular rate and rhythm; No murmurs, rubs, or gallops  ABDOMEN: Soft, Nontender, Nondistended; Bowel sounds present.  No HSM.  No MTR.  No scars.    EXTREMITIES:  2+ Peripheral Pulses, No clubbing, cyanosis, or edema  LYMPH: No lymphadenopathy noted  SKIN: No rashes or lesions      LABS:                        11.3   12.75 )-----------( 72       ( 26 Feb 2021 07:54 )             33.9     02-26    138  |  107  |  32.0<H>  ----------------------------<  118<H>  4.0   |  21.0<L>  |  1.55<H>    Ca    9.2      26 Feb 2021 07:54  Phos  2.9     02-25  Mg     1.3     02-25    TPro  4.9<L>  /  Alb  2.7<L>  /  TBili  0.9  /  DBili  x   /  AST  31  /  ALT  23  /  AlkPhos  111  02-26    Hepatitis A, B, C:  Neagtive.           RADIOLOGY & ADDITIONAL STUDIES:

## 2021-02-26 NOTE — PROGRESS NOTE ADULT - SUBJECTIVE AND OBJECTIVE BOX
Patient is a 89y old  Female who presents with a chief complaint of abdominal distention , dysphagia     seen her in am , tolerating diet had breakfast in am     she is sitting in the chair comfortable , no distress       Vital Signs Last 24 Hrs  T(C): 36.7 (2021 11:24), Max: 36.8 (2021 05:11)  T(F): 98 (2021 11:24), Max: 98.3 (2021 05:11)  HR: 80 (2021 11:24) (78 - 85)  BP: 99/68 (2021 11:24) (99/68 - 133/57)  BP(mean): --  RR: 18 (2021 11:24) (18 - 18)  SpO2: 96% (2021 11:24) (93% - 96%)    PHYSICAL EXAM:  General : no distress , awake alert   Neck supple, no JVD   Pulmonary : CTA bilateral   Cardio : regular s1 /s2   Abd: soft no tenderness , BS positive , mild distention   Ext : no pretibial edema                             11.3   12.75 )-----------( 72       ( 2021 07:54 )             33.9   02-26    138  |  107  |  32.0<H>  ----------------------------<  118<H>  4.0   |  21.0<L>  |  1.55<H>    Ca    9.2      2021 07:54  Phos  2.9     02-25  Mg     1.3     02-25    TPro  4.9<L>  /  Alb  2.7<L>  /  TBili  0.9  /  DBili  x   /  AST  31  /  ALT  23  /  AlkPhos  111  02-26    PTT - ( 2021 13:29 )  PTT:31.8 sec  Urinalysis Basic - ( 2021 13:57 )    Color: Yellow / Appearance: Clear / S.015 / pH: x  Gluc: x / Ketone: Negative  / Bili: Negative / Urobili: Negative   Blood: x / Protein: Negative / Nitrite: Positive   Leuk Esterase: Small / RBC: 0-2 /HPF / WBC 0-2   Sq Epi: x / Non Sq Epi: Few / Bacteria: Few        RADIOLOGY & ADDITIONAL TESTS:

## 2021-02-26 NOTE — PROGRESS NOTE ADULT - SUBJECTIVE AND OBJECTIVE BOX
NEPHROLOGY INTERVAL HPI/OVERNIGHT EVENTS:    feels better   OOB to chair   No abd pain   Remains on LAsix     MEDICATIONS  (STANDING):  allopurinol 200 milliGRAM(s) Oral daily  aspirin enteric coated 81 milliGRAM(s) Oral daily  cefTRIAXone   IVPB 1000 milliGRAM(s) IV Intermittent every 24 hours  cefTRIAXone   IVPB      furosemide    Tablet 20 milliGRAM(s) Oral daily  heparin   Injectable 5000 Unit(s) SubCutaneous every 12 hours  lactobacillus acidophilus 1 Tablet(s) Oral two times a day  magnesium sulfate  IVPB 2 Gram(s) IV Intermittent every 1 hour  metoprolol succinate ER 50 milliGRAM(s) Oral daily  multivitamin 1 Tablet(s) Oral daily  nystatin    Suspension [id number] Unit(s) Oral four times a day  pantoprazole  Injectable 40 milliGRAM(s) IV Push two times a day  sucralfate suspension 1 Gram(s) Oral two times a day    MEDICATIONS  (PRN):  acetaminophen   Tablet .. 650 milliGRAM(s) Oral every 6 hours PRN Temp greater or equal to 38C (100.4F), Mild Pain (1 - 3)  ondansetron Injectable 4 milliGRAM(s) IV Push every 6 hours PRN Nausea      Allergies    No Known Allergies    Intolerances          Vital Signs Last 24 Hrs  T(C): 36.7 (2021 11:24), Max: 36.8 (2021 05:11)  T(F): 98 (2021 11:24), Max: 98.3 (2021 05:11)  HR: 80 (2021 11:24) (78 - 85)  BP: 99/68 (2021 11:24) (99/68 - 133/57)  BP(mean): --  RR: 18 (2021 11:24) (18 - 18)  SpO2: 96% (2021 11:24) (93% - 96%)  Daily     Daily   I&O's Detail    I&O's Summary    GENERAL: appears chronically ill, oriented.  HEAD:  Atraumatic, Normocephalic  EYES: EOMI, PERRLA, conjunctiva and sclera clear  ENMT: No tonsillar erythema, exudates, or enlargement; Moist mucous membranes,   NECK: Supple, neck  veins wnl  NERVOUS SYSTEM:  Alert & Oriented ,; Motor Strength wnl upper and lower extremities;  CHEST/LUNG: Clear to percussion bilaterally; No rales, rhonchi, wheezing, or rubs; Right breast implant , left scar  HEART: Regular rate and rhythm; No  rubs, or gallops; + pacer left chest wall  ABDOMEN: Soft,  Nondistended; Bowel sounds present, body wall and flank edema with dilated veins upper abdominal wall, small dress RLQ  EXTREMITIES:  OA  LABS:                        11.3   12.75 )-----------( 72       ( 2021 07:54 )             33.9         138  |  107  |  32.0<H>  ----------------------------<  118<H>  4.0   |  21.0<L>  |  1.55<H>    Ca    9.2      2021 07:54  Phos  2.9       Mg     1.3         TPro  4.9<L>  /  Alb  2.7<L>  /  TBili  0.9  /  DBili  x   /  AST  31  /  ALT  23  /  AlkPhos  111        Urinalysis Basic - ( 2021 13:57 )    Color: Yellow / Appearance: Clear / S.015 / pH: x  Gluc: x / Ketone: Negative  / Bili: Negative / Urobili: Negative   Blood: x / Protein: Negative / Nitrite: Positive   Leuk Esterase: Small / RBC: 0-2 /HPF / WBC 0-2   Sq Epi: x / Non Sq Epi: Few / Bacteria: Few      < from: US Renal (21 @ 10:31) >     EXAM:  US KIDNEY(S)                          PROCEDURE DATE:  2021          INTERPRETATION:  CLINICAL INFORMATION: Acute renal failure.    COMPARISON: CT abdomen/pelvis 2021    TECHNIQUE: Sonography of the kidneys.    FINDINGS:    Right kidney: 8.3 cm. Echogenic parenchyma. No renal mass, hydronephrosis or calculi.    Left kidney: 8.8 cm. Echogenic parenchyma. No renal mass, hydronephrosis or calculi.    Other: Small to moderate amount of upper abdominal ascites. Nodular liver compatible with known cirrhosis.    IMPRESSION:    Echogenic kidneys suggestive of medical renal disease.    No hydronephrosis.    Ascites.              DAMIEN GONZALEZ MD; Attending Radiologist  This document has been electronically signed. 110:43AM    < end of copied text >  < from: IR US Guided Placement of Drainage Catheter (21 @ 16:55) >  IMPRESSION:  Technically successful paracentesis with removal of 2100 cc of straw-colored fluid.            WAQAR LIRA MD; Attending Interventional Radiologist  This document has been electronically signed. 2021  1:26PM    < end of copied text >

## 2021-02-27 ENCOUNTER — TRANSCRIPTION ENCOUNTER (OUTPATIENT)
Age: 86
End: 2021-02-27

## 2021-02-27 VITALS
OXYGEN SATURATION: 96 % | HEART RATE: 80 BPM | SYSTOLIC BLOOD PRESSURE: 106 MMHG | RESPIRATION RATE: 18 BRPM | DIASTOLIC BLOOD PRESSURE: 56 MMHG | TEMPERATURE: 98 F

## 2021-02-27 DIAGNOSIS — K74.60 UNSPECIFIED CIRRHOSIS OF LIVER: ICD-10-CM

## 2021-02-27 LAB
ANION GAP SERPL CALC-SCNC: 12 MMOL/L — SIGNIFICANT CHANGE UP (ref 5–17)
BUN SERPL-MCNC: 32 MG/DL — HIGH (ref 8–20)
CALCIUM SERPL-MCNC: 9.4 MG/DL — SIGNIFICANT CHANGE UP (ref 8.6–10.2)
CHLORIDE SERPL-SCNC: 104 MMOL/L — SIGNIFICANT CHANGE UP (ref 98–107)
CO2 SERPL-SCNC: 21 MMOL/L — LOW (ref 22–29)
CREAT SERPL-MCNC: 1.68 MG/DL — HIGH (ref 0.5–1.3)
GLUCOSE SERPL-MCNC: 137 MG/DL — HIGH (ref 70–99)
HCT VFR BLD CALC: 36.4 % — SIGNIFICANT CHANGE UP (ref 34.5–45)
HGB BLD-MCNC: 12.4 G/DL — SIGNIFICANT CHANGE UP (ref 11.5–15.5)
MCHC RBC-ENTMCNC: 34.1 GM/DL — SIGNIFICANT CHANGE UP (ref 32–36)
MCHC RBC-ENTMCNC: 34.5 PG — HIGH (ref 27–34)
MCV RBC AUTO: 101.4 FL — HIGH (ref 80–100)
PLATELET # BLD AUTO: 67 K/UL — LOW (ref 150–400)
POTASSIUM SERPL-MCNC: 4.2 MMOL/L — SIGNIFICANT CHANGE UP (ref 3.5–5.3)
POTASSIUM SERPL-SCNC: 4.2 MMOL/L — SIGNIFICANT CHANGE UP (ref 3.5–5.3)
RBC # BLD: 3.59 M/UL — LOW (ref 3.8–5.2)
RBC # FLD: 13.8 % — SIGNIFICANT CHANGE UP (ref 10.3–14.5)
SODIUM SERPL-SCNC: 137 MMOL/L — SIGNIFICANT CHANGE UP (ref 135–145)
WBC # BLD: 12.8 K/UL — HIGH (ref 3.8–10.5)
WBC # FLD AUTO: 12.8 K/UL — HIGH (ref 3.8–10.5)

## 2021-02-27 PROCEDURE — 99239 HOSP IP/OBS DSCHRG MGMT >30: CPT

## 2021-02-27 PROCEDURE — 99233 SBSQ HOSP IP/OBS HIGH 50: CPT

## 2021-02-27 RX ORDER — HYOSCYAMINE SULFATE 0.13 MG
1 TABLET ORAL
Qty: 0 | Refills: 0 | DISCHARGE

## 2021-02-27 RX ORDER — PHENAZOPYRIDINE HCL 100 MG
1 TABLET ORAL
Qty: 0 | Refills: 0 | DISCHARGE

## 2021-02-27 RX ORDER — NYSTATIN 500MM UNIT
5 POWDER (EA) MISCELLANEOUS
Qty: 80 | Refills: 0
Start: 2021-02-27 | End: 2021-03-02

## 2021-02-27 RX ORDER — RAMIPRIL 5 MG
1 CAPSULE ORAL
Qty: 0 | Refills: 0 | DISCHARGE

## 2021-02-27 RX ORDER — SUCRALFATE 1 G
10 TABLET ORAL
Qty: 450 | Refills: 0
Start: 2021-02-27 | End: 2021-03-13

## 2021-02-27 RX ORDER — METOPROLOL TARTRATE 50 MG
1 TABLET ORAL
Qty: 0 | Refills: 0 | DISCHARGE

## 2021-02-27 RX ORDER — CHOLECALCIFEROL (VITAMIN D3) 125 MCG
1 CAPSULE ORAL
Qty: 0 | Refills: 0 | DISCHARGE

## 2021-02-27 RX ORDER — SPIRONOLACTONE 25 MG/1
1 TABLET, FILM COATED ORAL
Qty: 5 | Refills: 0
Start: 2021-02-27 | End: 2021-03-03

## 2021-02-27 RX ORDER — ASPIRIN/CALCIUM CARB/MAGNESIUM 324 MG
1 TABLET ORAL
Qty: 0 | Refills: 0 | DISCHARGE

## 2021-02-27 RX ORDER — PANTOPRAZOLE SODIUM 20 MG/1
1 TABLET, DELAYED RELEASE ORAL
Qty: 30 | Refills: 0
Start: 2021-02-27 | End: 2021-03-28

## 2021-02-27 RX ORDER — COLCHICINE 0.6 MG
1 TABLET ORAL
Qty: 0 | Refills: 0 | DISCHARGE

## 2021-02-27 RX ORDER — SIMVASTATIN 20 MG/1
1 TABLET, FILM COATED ORAL
Qty: 0 | Refills: 0 | DISCHARGE

## 2021-02-27 RX ORDER — ALLOPURINOL 300 MG
1 TABLET ORAL
Qty: 0 | Refills: 0 | DISCHARGE

## 2021-02-27 RX ORDER — FUROSEMIDE 40 MG
1 TABLET ORAL
Qty: 30 | Refills: 0
Start: 2021-02-27 | End: 2021-03-28

## 2021-02-27 RX ORDER — METOPROLOL TARTRATE 50 MG
1 TABLET ORAL
Qty: 0 | Refills: 0 | DISCHARGE
Start: 2021-02-27

## 2021-02-27 RX ADMIN — Medication 50 MILLIGRAM(S): at 05:58

## 2021-02-27 RX ADMIN — Medication 500000 UNIT(S): at 05:58

## 2021-02-27 RX ADMIN — Medication 81 MILLIGRAM(S): at 11:25

## 2021-02-27 RX ADMIN — CEFTRIAXONE 100 MILLIGRAM(S): 500 INJECTION, POWDER, FOR SOLUTION INTRAMUSCULAR; INTRAVENOUS at 16:21

## 2021-02-27 RX ADMIN — HEPARIN SODIUM 5000 UNIT(S): 5000 INJECTION INTRAVENOUS; SUBCUTANEOUS at 05:58

## 2021-02-27 RX ADMIN — Medication 20 MILLIGRAM(S): at 05:58

## 2021-02-27 RX ADMIN — Medication 500000 UNIT(S): at 11:25

## 2021-02-27 RX ADMIN — SPIRONOLACTONE 50 MILLIGRAM(S): 25 TABLET, FILM COATED ORAL at 05:58

## 2021-02-27 RX ADMIN — Medication 200 MILLIGRAM(S): at 11:25

## 2021-02-27 RX ADMIN — Medication 1 TABLET(S): at 11:26

## 2021-02-27 RX ADMIN — PANTOPRAZOLE SODIUM 40 MILLIGRAM(S): 20 TABLET, DELAYED RELEASE ORAL at 05:57

## 2021-02-27 RX ADMIN — Medication 1 GRAM(S): at 05:57

## 2021-02-27 RX ADMIN — Medication 500000 UNIT(S): at 00:53

## 2021-02-27 RX ADMIN — Medication 1 TABLET(S): at 05:58

## 2021-02-27 NOTE — DISCHARGE NOTE PROVIDER - HOSPITAL COURSE
Patient is a 89 y/o woman with pmhx of hypertension, cad s/p PCI x 2 (2011, 2013), h/o cryptogenic cirrhosis, Breast cancer and  gastric ulcer  and hyperlipidemia who presents with c/o abdominal distention/discomfort x 5 days  +dysphagia and odynophagia with presumed cryptogenic cirrhosis. Admitted for ASTER , ascites with fluid removal . She had paracentesis done by IR on admission , GI and nephrology consulted   fluid study c/w portal hypertension cirrhosis , non alcoholic , no SBP , diuretics given lasix and aldactone , cr is up to 1.6 from 1.33 . on admission UA with suspected UTI given empirical iv rocephin urine cx send however looks contaminated resend it urine cx on antibiotics day 3 , no symptoms   swallow evaluation bedside performed for dysphagia diet advanced to mechanical soft started on PPI and nystatin suspension empirically , tolerating diet   seen by PT rec to go home wit assistance   had informed daughter in details re all findings , discharge planing and follow up with GI and nephrology     total time spend 40 min coordinating care

## 2021-02-27 NOTE — PROGRESS NOTE ADULT - SUBJECTIVE AND OBJECTIVE BOX
Pt seen and examined. In NAD this AM w/o GI complaints.     REVIEW OF SYSTEMS:  Constitutional: No fever, weight loss or fatigue  Cardiovascular: No chest pain, palpitations, dizziness or leg swelling  Gastrointestinal: As noted above. No abdominal or epigastric pain. No nausea, vomiting or hematemesis; No diarrhea or constipation. No melena or hematochezia.  Skin: No itching, burning, rashes or lesions       MEDICATIONS:  MEDICATIONS  (STANDING):  allopurinol 200 milliGRAM(s) Oral daily  aspirin enteric coated 81 milliGRAM(s) Oral daily  cefTRIAXone   IVPB 1000 milliGRAM(s) IV Intermittent every 24 hours  cefTRIAXone   IVPB      furosemide    Tablet 20 milliGRAM(s) Oral daily  heparin   Injectable 5000 Unit(s) SubCutaneous every 12 hours  lactobacillus acidophilus 1 Tablet(s) Oral two times a day  metoprolol succinate ER 50 milliGRAM(s) Oral daily  multivitamin 1 Tablet(s) Oral daily  nystatin    Suspension 015011 Unit(s) Oral four times a day  pantoprazole  Injectable 40 milliGRAM(s) IV Push two times a day  spironolactone 50 milliGRAM(s) Oral daily  sucralfate suspension 1 Gram(s) Oral two times a day    MEDICATIONS  (PRN):  acetaminophen   Tablet .. 650 milliGRAM(s) Oral every 6 hours PRN Temp greater or equal to 38C (100.4F), Mild Pain (1 - 3)  ondansetron Injectable 4 milliGRAM(s) IV Push every 6 hours PRN Nausea      Allergies    No Known Allergies    Intolerances        Vital Signs Last 24 Hrs  T(C): 37.1 (2021 04:17), Max: 37.1 (2021 04:17)  T(F): 98.7 (2021 04:17), Max: 98.7 (2021 04:17)  HR: 94 (2021 04:17) (66 - 94)  BP: 119/58 (2021 04:17) (99/68 - 119/58)  BP(mean): --  RR: 18 (2021 04:17) (18 - 18)  SpO2: 96% (2021 04:17) (96% - 96%)      PHYSICAL EXAM:    General: Well developed; well nourished; in no acute distress  HEENT: MMM, conjunctiva pink and sclera anicteric.  Lungs: clear to auscultation bilaterally.  Cor: RRR S1, S2 only.  Gastrointestinal: Abdomen: Soft non-tender non-distended; Normal bowel sounds; No hepatosplenomegaly.  Extremities: no cyanosis, clubbing or edema.  Skin: Warm and dry. No obvious rash  Neuro: Pt. a + o x 3, no asterixsis    LABS:  CBC Full  -  ( 2021 07:54 )  WBC Count : 12.75 K/uL  RBC Count : 3.32 M/uL  Hemoglobin : 11.3 g/dL  Hematocrit : 33.9 %  Platelet Count - Automated : 72 K/uL  Mean Cell Volume : 102.1 fl  Mean Cell Hemoglobin : 34.0 pg  Mean Cell Hemoglobin Concentration : 33.3 gm/dL  Auto Neutrophil # : x  Auto Lymphocyte # : x  Auto Monocyte # : x  Auto Eosinophil # : x  Auto Basophil # : x  Auto Neutrophil % : x  Auto Lymphocyte % : x  Auto Monocyte % : x  Auto Eosinophil % : x  Auto Basophil % : x        138  |  107  |  32.0<H>  ----------------------------<  118<H>  4.0   |  21.0<L>  |  1.55<H>    Ca    9.2      2021 07:54    TPro  4.9<L>  /  Alb  2.7<L>  /  TBili  0.9  /  DBili  x   /  AST  31  /  ALT  23  /  AlkPhos  111            Urinalysis Basic - ( 2021 20:29 )    Color: Yellow / Appearance: Clear / S.015 / pH: x  Gluc: x / Ketone: Negative  / Bili: Negative / Urobili: Negative mg/dL   Blood: x / Protein: Negative mg/dL / Nitrite: Negative   Leuk Esterase: Small / RBC: 0-2 /HPF / WBC 3-5   Sq Epi: x / Non Sq Epi: Moderate / Bacteria: x                RADIOLOGY & ADDITIONAL STUDIES (The following images were personally reviewed):

## 2021-02-27 NOTE — DISCHARGE NOTE NURSING/CASE MANAGEMENT/SOCIAL WORK - PATIENT PORTAL LINK FT
You can access the FollowMyHealth Patient Portal offered by [facility] by registering at the following website: [email or web address]. By joining [employer]’s FollowMyHealth portal, you will also be able to view your health information using other applications (apps) compatible with our system.

## 2021-02-27 NOTE — DISCHARGE NOTE PROVIDER - NSDCMRMEDTOKEN_GEN_ALL_CORE_FT
allopurinol 300 mg oral tablet: 1 tab(s) orally once a day  aspirin 81 mg oral tablet: 1 tab(s) orally once a day  colchicine 0.6 mg oral tablet: 1 tab(s) orally once a day  furosemide 20 mg oral tablet: 1 tab(s) orally once a day  hyoscyamine 0.125 mg oral tablet: 1 tab(s) orally 4 times a day, As Needed  metoprolol succinate 50 mg oral tablet, extended release: 1 tab(s) orally once a day  Multiple Vitamins oral tablet: 1 tab(s) orally once a day  nystatin 100,000 units/mL oral suspension: 5 milliliter(s) orally 4 times a day  pantoprazole 40 mg oral delayed release tablet: 1 tab(s) orally once a day  simvastatin 20 mg oral tablet: 1 tab(s) orally once a day (at bedtime)  spironolactone 25 mg oral tablet: 1 tab(s) orally once a day   sucralfate 1 g/10 mL oral suspension: 10 milliliter(s) orally 3 times a day before meals

## 2021-02-27 NOTE — PROGRESS NOTE ADULT - ASSESSMENT
Decompensated cryptogenic cirrhosis.  Modest worsening of renal function.  Benign Tap;  No SBP.    Advise decrease dose of aldactone to 50 mg po qd.  Follow Lytes.  Maintain Na restriction.  
Patient is a 89 y/o woman with pmhx of hypertension, cad s/p PCI x 2 (2011, 2013), h/o cryptogenic cirrhosis, Breast cancer and  gastric ulcer  and hyperlipidemia who presents with c/o abdominal distention/discomfort x 5 days  +dysphagia and odynophagia with presumed cryptogenic cirrhosis. Admitted for ASTER , ascites with fluid removal   seen by nephrology for RF and GI   swallow evaluation bedside performed , started on PPI and nystatin suspension     1-Abdominal distention 2/2 ascites 2/2 cryptogenic cirrhosis potentially due to ? NAFLD from [place]  s/p paracentesis by IR   gi follow up appreciated   cont lasix and aldactone   monitor BUN cr     2- ASTER likely 2/2 hepatorenal syndrome  cr is up on diuretics   cont  to monitor closely     3- Leukocytosis r/o UTI   cont iv rocephin   urine cx is contaminated likely more than 3 micro organism   repeat cx send     5- odynophagia/ dysphagia   cont PPI and nystatin suspension   improving   swallow evaluation noted     6- Hypomagnesemia   replace iv mg ordered     7-h/o breast cancer h/o surgery and treatment in the past       Pt ordered     home with assistance in 24 hour likely     repeat labs in am   d/w daughter in [facility] over the phone   
CKD   Cirrhosis   S/P 2.1 L paracentesis   No hydro on renal sonogram  Watch on the current Lasix   Cleared from a renal perspective for discharge   
CKD   Cirrhosis   S/P 2.1 L paracentesis   No hydro on renal sonogram  Watch on the current Lasix 
Patient is a 89 y/o woman with pmhx of hypertension, cad s/p PCI x 2 (2011, 2013), h/o cryptogenic cirrhosis, Breast cancer and  gastric ulcer  and hyperlipidemia who presents with c/o abdominal distention/discomfort x 5 days  +dysphagia and odynophagia with presumed cryptogenic cirrhosis. Admitted for ASTER , ascites with fluid removal     1-Abdominal distention 2/2 ascites 2/2 cryptogenic cirrhosis potentially due to ? NAFLD from [place]ENT  s/p paracentesis by IR   gi follow up appreciated   cont lasix and aldactone       2- ASTER likely 2/2 hepatorenal syndrome  avoid nephrotoxic meds   cont to monitor     3- Leukocytosis r/o UTI   cont iv rocephin   urine cx is pending     4- HTN / CAD   on metoprolol   cont aspirin , stating   stable     5- odynophagia/ dysphagia   cont PPI   add carafate   s[eech evaluation      6- h/o breast cancer h/o surgery and treatment in the past       Pt   ambulate   home with assistance in 1-2 days

## 2021-02-27 NOTE — PROGRESS NOTE ADULT - SUBJECTIVE AND OBJECTIVE BOX
NEPHROLOGY INTERVAL HPI/OVERNIGHT EVENTS:    Seen earlier   Feels better   No new events     MEDICATIONS  (STANDING):  allopurinol 200 milliGRAM(s) Oral daily  aspirin enteric coated 81 milliGRAM(s) Oral daily  furosemide    Tablet 20 milliGRAM(s) Oral daily  heparin   Injectable 5000 Unit(s) SubCutaneous every 12 hours  lactobacillus acidophilus 1 Tablet(s) Oral two times a day  metoprolol succinate ER 50 milliGRAM(s) Oral daily  multivitamin 1 Tablet(s) Oral daily  nystatin    Suspension 641334 Unit(s) Oral four times a day  pantoprazole  Injectable 40 milliGRAM(s) IV Push two times a day  spironolactone 50 milliGRAM(s) Oral daily  sucralfate suspension 1 Gram(s) Oral two times a day    MEDICATIONS  (PRN):  acetaminophen   Tablet .. 650 milliGRAM(s) Oral every 6 hours PRN Temp greater or equal to 38C (100.4F), Mild Pain (1 - 3)  ondansetron Injectable 4 milliGRAM(s) IV Push every 6 hours PRN Nausea      Allergies    No Known Allergies    Intolerances      Vital Signs Last 24 Hrs  T(C): 36.9 (2021 16:22), Max: 37.1 (2021 04:17)  T(F): 98.4 (2021 16:22), Max: 98.7 (2021 04:17)  HR: 80 (2021 16:22) (79 - 94)  BP: 106/56 (2021 16:22) (106/56 - 119/58)  BP(mean): --  RR: 18 (2021 16:22) (18 - 18)  SpO2: 96% (2021 16:22) (95% - 96%)  Daily     Daily   I&O's Detail    I&O's Summary  GENERAL: appears chronically ill, oriented.  HEAD:  Atraumatic, Normocephalic  EYES: EOMI, PERRLA, conjunctiva and sclera clear  ENMT: No tonsillar erythema, exudates, or enlargement; Moist mucous membranes,   NECK: Supple, neck  veins wnl  NERVOUS SYSTEM:  Alert & Oriented ,; Motor Strength wnl upper and lower extremities;  CHEST/LUNG: Clear to percussion bilaterally; No rales, rhonchi, wheezing, or rubs; Right breast implant , left scar  HEART: Regular rate and rhythm; No  rubs, or gallops; + pacer left chest wall  ABDOMEN: Soft,  Nondistended; Bowel sounds present, body wall and flank edema with dilated veins upper abdominal wall, small dress RLQ  EXTREMITIES:  OA  LABS:                        12.4   12.80 )-----------( 67       ( 2021 12:30 )             36.4         137  |  104  |  32.0<H>  ----------------------------<  137<H>  4.2   |  21.0<L>  |  1.68<H>    Ca    9.4      2021 12:30    TPro  4.9<L>  /  Alb  2.7<L>  /  TBili  0.9  /  DBili  x   /  AST  31  /  ALT  23  /  AlkPhos  111  -      Urinalysis Basic - ( 2021 20:29 )    Color: Yellow / Appearance: Clear / S.015 / pH: x  Gluc: x / Ketone: Negative  / Bili: Negative / Urobili: Negative mg/dL   Blood: x / Protein: Negative mg/dL / Nitrite: Negative   Leuk Esterase: Small / RBC: 0-2 /HPF / WBC 3-5   Sq Epi: x / Non Sq Epi: Moderate / Bacteria: x              RADIOLOGY & ADDITIONAL TESTS:

## 2021-02-27 NOTE — PROGRESS NOTE ADULT - REASON FOR ADMISSION
abdominal distention

## 2021-02-27 NOTE — PROGRESS NOTE ADULT - PROVIDER SPECIALTY LIST ADULT
Hospitalist
Intervent Radiology
Nephrology
Hospitalist
Nephrology
Gastroenterology

## 2021-02-27 NOTE — DISCHARGE NOTE PROVIDER - CARE PROVIDER_API CALL
Ricthie Horton (DO)  Medicine  [address] [place] [id number]  Phone: [phone]  Fax: [phone]  Follow Up Time:     Gorge Leblanc)  Internal Medicine; Nephrology  [address] [place] [id number]  Phone: [phone]  Fax: [phone]  Follow Up Time: 2 weeks

## 2021-02-27 NOTE — DISCHARGE NOTE PROVIDER - NSDCCPCAREPLAN_GEN_ALL_CORE_FT
PRINCIPAL DISCHARGE DIAGNOSIS  Diagnosis: Ascitic fluid  Assessment and Plan of Treatment: [ost removal, continue low fat diet   furesemide and aldactone   follow up with Dr Horton      SECONDARY DISCHARGE DIAGNOSES  Diagnosis: Acute renal insufficiency  Assessment and Plan of Treatment: on cronic kidney failure   get repeat blood work in 1 week monitor closely on diuretics   see nephrologit in 1-2 weeks    Diagnosis: Swallowing difficulty  Assessment and Plan of Treatment: improved   continue mechanical soft diet   sucralfate and protonix   if symptoms worsens see dr Horton for further testing

## 2021-02-27 NOTE — DISCHARGE NOTE PROVIDER - NSDCPNSUBOBJ_GEN_ALL_CORE
pt is seen in am   doing well, no problems swallowing reported tolerating diet     labs reviewed   d/w daughter over the phone     Vital Signs Last 24 Hrs  T(C): 37 (27 Feb 2021 11:50), Max: 37.1 (27 Feb 2021 04:17)  T(F): 98.6 (27 Feb 2021 11:50), Max: 98.7 (27 Feb 2021 04:17)  HR: 79 (27 Feb 2021 11:50) (66 - 94)  BP: 118/65 (27 Feb 2021 11:50) (112/54 - 119/58)  BP(mean): --  RR: 18 (27 Feb 2021 11:50) (18 - 18)  SpO2: 95% (27 Feb 2021 11:50) (95% - 96%)    exam no change stable

## 2021-02-27 NOTE — PROGRESS NOTE ADULT - PROBLEM SELECTOR PLAN 1
Clinically improved. Analysis of ascitic fluid revealed no SBP and was c/w portal HTN from cirrhosis which is non-alcoholic in etiology. Await this AM's labs. If electrolytes OK she can be discharged home on current diuretics and low salt diet.
stable

## 2021-02-28 LAB
CULTURE RESULTS: SIGNIFICANT CHANGE UP
SPECIMEN SOURCE: SIGNIFICANT CHANGE UP

## 2021-03-01 LAB
CULTURE RESULTS: NO GROWTH — SIGNIFICANT CHANGE UP
SPECIMEN SOURCE: SIGNIFICANT CHANGE UP

## 2021-03-02 ENCOUNTER — APPOINTMENT (OUTPATIENT)
Dept: INTERVENTIONAL RADIOLOGY/VASCULAR | Facility: CLINIC | Age: 86
End: 2021-03-02

## 2021-03-16 PROCEDURE — 84466 ASSAY OF TRANSFERRIN: CPT

## 2021-03-16 PROCEDURE — 75989 ABSCESS DRAINAGE UNDER X-RAY: CPT

## 2021-03-16 PROCEDURE — 97530 THERAPEUTIC ACTIVITIES: CPT

## 2021-03-16 PROCEDURE — 89051 BODY FLUID CELL COUNT: CPT

## 2021-03-16 PROCEDURE — 82728 ASSAY OF FERRITIN: CPT

## 2021-03-16 PROCEDURE — 83735 ASSAY OF MAGNESIUM: CPT

## 2021-03-16 PROCEDURE — 83550 IRON BINDING TEST: CPT

## 2021-03-16 PROCEDURE — 92610 EVALUATE SWALLOWING FUNCTION: CPT

## 2021-03-16 PROCEDURE — 76775 US EXAM ABDO BACK WALL LIM: CPT

## 2021-03-16 PROCEDURE — 93005 ELECTROCARDIOGRAM TRACING: CPT

## 2021-03-16 PROCEDURE — 87205 SMEAR GRAM STAIN: CPT

## 2021-03-16 PROCEDURE — 83935 ASSAY OF URINE OSMOLALITY: CPT

## 2021-03-16 PROCEDURE — 85730 THROMBOPLASTIN TIME PARTIAL: CPT

## 2021-03-16 PROCEDURE — 74176 CT ABD & PELVIS W/O CONTRAST: CPT

## 2021-03-16 PROCEDURE — U0003: CPT

## 2021-03-16 PROCEDURE — 85027 COMPLETE CBC AUTOMATED: CPT

## 2021-03-16 PROCEDURE — 97116 GAIT TRAINING THERAPY: CPT

## 2021-03-16 PROCEDURE — 92526 ORAL FUNCTION THERAPY: CPT

## 2021-03-16 PROCEDURE — 84100 ASSAY OF PHOSPHORUS: CPT

## 2021-03-16 PROCEDURE — 86901 BLOOD TYPING SEROLOGIC RH(D): CPT

## 2021-03-16 PROCEDURE — 83036 HEMOGLOBIN GLYCOSYLATED A1C: CPT

## 2021-03-16 PROCEDURE — 84156 ASSAY OF PROTEIN URINE: CPT

## 2021-03-16 PROCEDURE — 80053 COMPREHEN METABOLIC PANEL: CPT

## 2021-03-16 PROCEDURE — 86900 BLOOD TYPING SEROLOGIC ABO: CPT

## 2021-03-16 PROCEDURE — 81001 URINALYSIS AUTO W/SCOPE: CPT

## 2021-03-16 PROCEDURE — 84443 ASSAY THYROID STIM HORMONE: CPT

## 2021-03-16 PROCEDURE — 84300 ASSAY OF URINE SODIUM: CPT

## 2021-03-16 PROCEDURE — 83690 ASSAY OF LIPASE: CPT

## 2021-03-16 PROCEDURE — 80048 BASIC METABOLIC PNL TOTAL CA: CPT

## 2021-03-16 PROCEDURE — 82042 OTHER SOURCE ALBUMIN QUAN EA: CPT

## 2021-03-16 PROCEDURE — 71045 X-RAY EXAM CHEST 1 VIEW: CPT

## 2021-03-16 PROCEDURE — 83540 ASSAY OF IRON: CPT

## 2021-03-16 PROCEDURE — 85025 COMPLETE CBC W/AUTO DIFF WBC: CPT

## 2021-03-16 PROCEDURE — 80061 LIPID PANEL: CPT

## 2021-03-16 PROCEDURE — 36415 COLL VENOUS BLD VENIPUNCTURE: CPT

## 2021-03-16 PROCEDURE — 87086 URINE CULTURE/COLONY COUNT: CPT

## 2021-03-16 PROCEDURE — 87070 CULTURE OTHR SPECIMN AEROBIC: CPT

## 2021-03-16 PROCEDURE — U0005: CPT

## 2021-03-16 PROCEDURE — 80074 ACUTE HEPATITIS PANEL: CPT

## 2021-03-16 PROCEDURE — 87075 CULTR BACTERIA EXCEPT BLOOD: CPT

## 2021-03-16 PROCEDURE — 84157 ASSAY OF PROTEIN OTHER: CPT

## 2021-03-16 PROCEDURE — 86850 RBC ANTIBODY SCREEN: CPT

## 2021-03-16 PROCEDURE — 85610 PROTHROMBIN TIME: CPT

## 2021-03-16 PROCEDURE — 86769 SARS-COV-2 COVID-19 ANTIBODY: CPT

## 2021-03-16 PROCEDURE — 84550 ASSAY OF BLOOD/URIC ACID: CPT

## 2021-03-16 PROCEDURE — 99285 EMERGENCY DEPT VISIT HI MDM: CPT | Mod: 25

## 2021-07-14 NOTE — ED PROVIDER NOTE - ENMT NEGATIVE STATEMENT, MLM
Detail Level: Detailed Quality 431: Preventive Care And Screening: Unhealthy Alcohol Use - Screening: Patient screened for unhealthy alcohol use using a single question and scores less than 2 times per year Quality 130: Documentation Of Current Medications In The Medical Record: Current Medications Documented Quality 402: Tobacco Use And Help With Quitting Among Adolescents: Patient screened for tobacco and never smoked Ears: no ear pain and no hearing problems. Nose: no nasal congestion and no nasal drainage. Mouth/Throat: no dysphagia, no hoarseness and no throat pain. Neck: no lumps, no pain, no stiffness and no swollen glands.

## 2022-12-15 NOTE — PATIENT PROFILE ADULT - IS PATIENT POST-MENOPAUSAL?
1. I was told the name of the physician that took care of my child while in the hospital.    2. I have been told about any important findings on my child's physical exam and my child's plan of care.    3. The doctor clearly explained my child's diagnosis and other possible diagnoses that were considered.    4. My child's doctor explained all the tests that were done and their results (if available). I understand that some of the test results may not be ready before we go home and I was told how I can get these results. I understand that a summary of my child's hospitalization and important test results will be shared with my child's outpatient doctor.    5. My child's doctor talked to me about what I need to do when we go home.    6. I understand what signs and symptoms to watch for. I understand what symptoms I would need to call my doctor for and/or return to the hospital.    7. I have the phone number to call the hospital for results and/or questions after I leave the hospital.
yes